# Patient Record
Sex: FEMALE | Race: WHITE | Employment: FULL TIME | URBAN - METROPOLITAN AREA
[De-identification: names, ages, dates, MRNs, and addresses within clinical notes are randomized per-mention and may not be internally consistent; named-entity substitution may affect disease eponyms.]

---

## 2019-01-05 ENCOUNTER — HOSPITAL ENCOUNTER (OUTPATIENT)
Age: 64
Discharge: HOME | End: 2019-01-05
Payer: COMMERCIAL

## 2019-01-05 LAB
ALBUMIN SERPL-MCNC: 4.2 G/DL (ref 3.5–5.2)
ALP BLD-CCNC: 61 U/L (ref 35–104)
ALT SERPL-CCNC: 118 U/L (ref 0–32)
ANION GAP SERPL CALCULATED.3IONS-SCNC: 11 MMOL/L (ref 7–16)
AST SERPL-CCNC: 84 U/L (ref 0–31)
BASOPHILS ABSOLUTE: 0.07 E9/L (ref 0–0.2)
BASOPHILS RELATIVE PERCENT: 1.1 % (ref 0–2)
BILIRUB SERPL-MCNC: 1.8 MG/DL (ref 0–1.2)
BUN BLDV-MCNC: 20 MG/DL (ref 8–23)
CALCIUM SERPL-MCNC: 8.8 MG/DL (ref 8.6–10.2)
CHLORIDE BLD-SCNC: 102 MMOL/L (ref 98–107)
CHOLESTEROL, TOTAL: 152 MG/DL (ref 0–199)
CO2: 27 MMOL/L (ref 22–29)
CREAT SERPL-MCNC: 0.7 MG/DL (ref 0.5–1)
CREATININE URINE: 148 MG/DL (ref 29–226)
EOSINOPHILS ABSOLUTE: 0.11 E9/L (ref 0.05–0.5)
EOSINOPHILS RELATIVE PERCENT: 1.8 % (ref 0–6)
GFR AFRICAN AMERICAN: >60
GFR NON-AFRICAN AMERICAN: >60 ML/MIN/1.73
GLUCOSE BLD-MCNC: 209 MG/DL (ref 74–99)
HBA1C MFR BLD: 7.9 % (ref 4–5.6)
HCT VFR BLD CALC: 41.4 % (ref 34–48)
HDLC SERPL-MCNC: 42 MG/DL
HEMOGLOBIN: 14.1 G/DL (ref 11.5–15.5)
IMMATURE GRANULOCYTES #: 0.02 E9/L
IMMATURE GRANULOCYTES %: 0.3 % (ref 0–5)
LDL CHOLESTEROL CALCULATED: 83 MG/DL (ref 0–99)
LYMPHOCYTES ABSOLUTE: 2.04 E9/L (ref 1.5–4)
LYMPHOCYTES RELATIVE PERCENT: 33.4 % (ref 20–42)
MCH RBC QN AUTO: 30.9 PG (ref 26–35)
MCHC RBC AUTO-ENTMCNC: 34.1 % (ref 32–34.5)
MCV RBC AUTO: 90.8 FL (ref 80–99.9)
MICROALBUMIN UR-MCNC: <12 MG/L
MICROALBUMIN/CREAT UR-RTO: ABNORMAL (ref 0–30)
MONOCYTES ABSOLUTE: 0.51 E9/L (ref 0.1–0.95)
MONOCYTES RELATIVE PERCENT: 8.3 % (ref 2–12)
NEUTROPHILS ABSOLUTE: 3.36 E9/L (ref 1.8–7.3)
NEUTROPHILS RELATIVE PERCENT: 55.1 % (ref 43–80)
PDW BLD-RTO: 12.4 FL (ref 11.5–15)
PLATELET # BLD: 144 E9/L (ref 130–450)
PMV BLD AUTO: 11.1 FL (ref 7–12)
POTASSIUM SERPL-SCNC: 4.4 MMOL/L (ref 3.5–5)
RBC # BLD: 4.56 E12/L (ref 3.5–5.5)
SODIUM BLD-SCNC: 140 MMOL/L (ref 132–146)
TOTAL PROTEIN: 6.8 G/DL (ref 6.4–8.3)
TRIGL SERPL-MCNC: 133 MG/DL (ref 0–149)
TSH SERPL DL<=0.05 MIU/L-ACNC: 1.09 UIU/ML (ref 0.27–4.2)
VITAMIN D 25-HYDROXY: 24 NG/ML (ref 30–100)
VLDLC SERPL CALC-MCNC: 27 MG/DL
WBC # BLD: 6.1 E9/L (ref 4.5–11.5)

## 2019-01-05 PROCEDURE — 83036 HEMOGLOBIN GLYCOSYLATED A1C: CPT

## 2019-01-05 PROCEDURE — 82044 UR ALBUMIN SEMIQUANTITATIVE: CPT

## 2019-01-05 PROCEDURE — 82570 ASSAY OF URINE CREATININE: CPT

## 2019-01-05 PROCEDURE — 36415 COLL VENOUS BLD VENIPUNCTURE: CPT

## 2019-01-05 PROCEDURE — 85025 COMPLETE CBC W/AUTO DIFF WBC: CPT

## 2019-01-05 PROCEDURE — 84443 ASSAY THYROID STIM HORMONE: CPT

## 2019-01-05 PROCEDURE — 80061 LIPID PANEL: CPT

## 2019-01-05 PROCEDURE — 82306 VITAMIN D 25 HYDROXY: CPT

## 2019-01-05 PROCEDURE — 80053 COMPREHEN METABOLIC PANEL: CPT

## 2019-02-15 ENCOUNTER — APPOINTMENT (OUTPATIENT)
Dept: GENERAL RADIOLOGY | Age: 64
End: 2019-02-15
Payer: COMMERCIAL

## 2019-02-15 ENCOUNTER — APPOINTMENT (OUTPATIENT)
Dept: CT IMAGING | Age: 64
End: 2019-02-15
Payer: COMMERCIAL

## 2019-02-15 ENCOUNTER — HOSPITAL ENCOUNTER (EMERGENCY)
Age: 64
Discharge: HOME | End: 2019-02-15
Attending: EMERGENCY MEDICINE
Payer: COMMERCIAL

## 2019-02-15 VITALS
OXYGEN SATURATION: 97 % | DIASTOLIC BLOOD PRESSURE: 70 MMHG | BODY MASS INDEX: 38.68 KG/M2 | RESPIRATION RATE: 17 BRPM | HEIGHT: 60 IN | SYSTOLIC BLOOD PRESSURE: 142 MMHG | TEMPERATURE: 97.9 F | WEIGHT: 197 LBS | HEART RATE: 64 BPM

## 2019-02-15 DIAGNOSIS — R10.10 PAIN OF UPPER ABDOMEN: Primary | ICD-10-CM

## 2019-02-15 LAB
ALBUMIN SERPL-MCNC: 4 G/DL (ref 3.5–5.2)
ALP BLD-CCNC: 79 U/L (ref 35–104)
ALT SERPL-CCNC: 44 U/L (ref 0–32)
ANION GAP SERPL CALCULATED.3IONS-SCNC: 14 MMOL/L (ref 7–16)
AST SERPL-CCNC: 30 U/L (ref 0–31)
BASOPHILS ABSOLUTE: 0.06 E9/L (ref 0–0.2)
BASOPHILS RELATIVE PERCENT: 0.6 % (ref 0–2)
BILIRUB SERPL-MCNC: 1 MG/DL (ref 0–1.2)
BILIRUBIN URINE: NEGATIVE
BLOOD, URINE: NEGATIVE
BUN BLDV-MCNC: 21 MG/DL (ref 8–23)
CALCIUM SERPL-MCNC: 9 MG/DL (ref 8.6–10.2)
CHLORIDE BLD-SCNC: 101 MMOL/L (ref 98–107)
CLARITY: CLEAR
CO2: 25 MMOL/L (ref 22–29)
COLOR: YELLOW
CREAT SERPL-MCNC: 0.7 MG/DL (ref 0.5–1)
EOSINOPHILS ABSOLUTE: 1.54 E9/L (ref 0.05–0.5)
EOSINOPHILS RELATIVE PERCENT: 16.4 % (ref 0–6)
GFR AFRICAN AMERICAN: >60
GFR NON-AFRICAN AMERICAN: >60 ML/MIN/1.73
GLUCOSE BLD-MCNC: 108 MG/DL (ref 74–99)
GLUCOSE URINE: NEGATIVE MG/DL
HCT VFR BLD CALC: 45.4 % (ref 34–48)
HEMOGLOBIN: 15.1 G/DL (ref 11.5–15.5)
IMMATURE GRANULOCYTES #: 0.02 E9/L
IMMATURE GRANULOCYTES %: 0.2 % (ref 0–5)
KETONES, URINE: ABNORMAL MG/DL
LACTIC ACID: 1.2 MMOL/L (ref 0.5–2.2)
LEUKOCYTE ESTERASE, URINE: NEGATIVE
LIPASE: 44 U/L (ref 13–60)
LYMPHOCYTES ABSOLUTE: 2.22 E9/L (ref 1.5–4)
LYMPHOCYTES RELATIVE PERCENT: 23.7 % (ref 20–42)
MCH RBC QN AUTO: 30.5 PG (ref 26–35)
MCHC RBC AUTO-ENTMCNC: 33.3 % (ref 32–34.5)
MCV RBC AUTO: 91.7 FL (ref 80–99.9)
MONOCYTES ABSOLUTE: 0.67 E9/L (ref 0.1–0.95)
MONOCYTES RELATIVE PERCENT: 7.2 % (ref 2–12)
NEUTROPHILS ABSOLUTE: 4.86 E9/L (ref 1.8–7.3)
NEUTROPHILS RELATIVE PERCENT: 51.9 % (ref 43–80)
NITRITE, URINE: NEGATIVE
PDW BLD-RTO: 12.4 FL (ref 11.5–15)
PH UA: 5.5 (ref 5–9)
PLATELET # BLD: 161 E9/L (ref 130–450)
PMV BLD AUTO: 11.2 FL (ref 7–12)
POTASSIUM SERPL-SCNC: 4.1 MMOL/L (ref 3.5–5)
PROTEIN UA: NEGATIVE MG/DL
RBC # BLD: 4.95 E12/L (ref 3.5–5.5)
SODIUM BLD-SCNC: 140 MMOL/L (ref 132–146)
SPECIFIC GRAVITY UA: >=1.03 (ref 1–1.03)
TOTAL PROTEIN: 6.7 G/DL (ref 6.4–8.3)
TROPONIN: <0.01 NG/ML (ref 0–0.03)
UROBILINOGEN, URINE: 0.2 E.U./DL
WBC # BLD: 9.4 E9/L (ref 4.5–11.5)

## 2019-02-15 PROCEDURE — 81003 URINALYSIS AUTO W/O SCOPE: CPT

## 2019-02-15 PROCEDURE — 83690 ASSAY OF LIPASE: CPT

## 2019-02-15 PROCEDURE — 36415 COLL VENOUS BLD VENIPUNCTURE: CPT

## 2019-02-15 PROCEDURE — 83605 ASSAY OF LACTIC ACID: CPT

## 2019-02-15 PROCEDURE — 99284 EMERGENCY DEPT VISIT MOD MDM: CPT

## 2019-02-15 PROCEDURE — 2580000003 HC RX 258: Performed by: STUDENT IN AN ORGANIZED HEALTH CARE EDUCATION/TRAINING PROGRAM

## 2019-02-15 PROCEDURE — 71045 X-RAY EXAM CHEST 1 VIEW: CPT

## 2019-02-15 PROCEDURE — 85025 COMPLETE CBC W/AUTO DIFF WBC: CPT

## 2019-02-15 PROCEDURE — 6360000002 HC RX W HCPCS: Performed by: STUDENT IN AN ORGANIZED HEALTH CARE EDUCATION/TRAINING PROGRAM

## 2019-02-15 PROCEDURE — 6360000004 HC RX CONTRAST MEDICATION: Performed by: RADIOLOGY

## 2019-02-15 PROCEDURE — 74177 CT ABD & PELVIS W/CONTRAST: CPT

## 2019-02-15 PROCEDURE — 80053 COMPREHEN METABOLIC PANEL: CPT

## 2019-02-15 PROCEDURE — 96375 TX/PRO/DX INJ NEW DRUG ADDON: CPT

## 2019-02-15 PROCEDURE — 84484 ASSAY OF TROPONIN QUANT: CPT

## 2019-02-15 PROCEDURE — 6370000000 HC RX 637 (ALT 250 FOR IP): Performed by: STUDENT IN AN ORGANIZED HEALTH CARE EDUCATION/TRAINING PROGRAM

## 2019-02-15 PROCEDURE — 96374 THER/PROPH/DIAG INJ IV PUSH: CPT

## 2019-02-15 RX ORDER — 0.9 % SODIUM CHLORIDE 0.9 %
1000 INTRAVENOUS SOLUTION INTRAVENOUS ONCE
Status: DISCONTINUED | OUTPATIENT
Start: 2019-02-15 | End: 2019-02-15

## 2019-02-15 RX ORDER — DEXLANSOPRAZOLE 60 MG/1
60 CAPSULE, DELAYED RELEASE ORAL DAILY
COMMUNITY

## 2019-02-15 RX ORDER — MORPHINE SULFATE 4 MG/ML
4 INJECTION, SOLUTION INTRAMUSCULAR; INTRAVENOUS ONCE
Status: COMPLETED | OUTPATIENT
Start: 2019-02-15 | End: 2019-02-15

## 2019-02-15 RX ORDER — GLIPIZIDE 5 MG/1
10 TABLET ORAL
COMMUNITY
End: 2019-02-15 | Stop reason: DRUGHIGH

## 2019-02-15 RX ORDER — ONDANSETRON 2 MG/ML
4 INJECTION INTRAMUSCULAR; INTRAVENOUS ONCE
Status: COMPLETED | OUTPATIENT
Start: 2019-02-15 | End: 2019-02-15

## 2019-02-15 RX ORDER — LATANOPROST 50 UG/ML
1 SOLUTION/ DROPS OPHTHALMIC NIGHTLY
COMMUNITY

## 2019-02-15 RX ORDER — GLIPIZIDE 5 MG/1
10 TABLET, FILM COATED, EXTENDED RELEASE ORAL DAILY
COMMUNITY

## 2019-02-15 RX ORDER — 0.9 % SODIUM CHLORIDE 0.9 %
1000 INTRAVENOUS SOLUTION INTRAVENOUS ONCE
Status: COMPLETED | OUTPATIENT
Start: 2019-02-15 | End: 2019-02-15

## 2019-02-15 RX ORDER — LOSARTAN POTASSIUM 50 MG/1
50 TABLET ORAL DAILY
COMMUNITY

## 2019-02-15 RX ORDER — SUCRALFATE ORAL 1 G/10ML
1 SUSPENSION ORAL 4 TIMES DAILY
Qty: 1200 ML | Refills: 0 | Status: SHIPPED | OUTPATIENT
Start: 2019-02-15 | End: 2019-03-17

## 2019-02-15 RX ADMIN — LIDOCAINE HYDROCHLORIDE: 20 SOLUTION ORAL; TOPICAL at 10:29

## 2019-02-15 RX ADMIN — MORPHINE SULFATE 4 MG: 4 INJECTION, SOLUTION INTRAMUSCULAR; INTRAVENOUS at 09:40

## 2019-02-15 RX ADMIN — IOHEXOL 50 ML: 240 INJECTION, SOLUTION INTRATHECAL; INTRAVASCULAR; INTRAVENOUS; ORAL at 11:29

## 2019-02-15 RX ADMIN — ONDANSETRON 4 MG: 2 INJECTION INTRAMUSCULAR; INTRAVENOUS at 09:40

## 2019-02-15 RX ADMIN — SODIUM CHLORIDE 1000 ML: 9 INJECTION, SOLUTION INTRAVENOUS at 09:40

## 2019-02-15 RX ADMIN — IOPAMIDOL 80 ML: 755 INJECTION, SOLUTION INTRAVENOUS at 11:29

## 2019-02-15 ASSESSMENT — ENCOUNTER SYMPTOMS
SHORTNESS OF BREATH: 0
COLOR CHANGE: 0
SORE THROAT: 0
BLOOD IN STOOL: 0
ABDOMINAL DISTENTION: 0
ABDOMINAL PAIN: 1
STRIDOR: 0
COUGH: 0
BACK PAIN: 0
VOMITING: 0
NAUSEA: 1
WHEEZING: 0
CONSTIPATION: 0
ANAL BLEEDING: 0
DIARRHEA: 1

## 2019-02-15 ASSESSMENT — PAIN DESCRIPTION - PAIN TYPE
TYPE: ACUTE PAIN
TYPE: ACUTE PAIN

## 2019-02-15 ASSESSMENT — PAIN DESCRIPTION - LOCATION: LOCATION: ABDOMEN

## 2019-02-15 ASSESSMENT — PAIN SCALES - GENERAL
PAINLEVEL_OUTOF10: 4
PAINLEVEL_OUTOF10: 10

## 2019-02-15 ASSESSMENT — PAIN DESCRIPTION - DESCRIPTORS: DESCRIPTORS: ACHING

## 2019-02-19 LAB
EKG ATRIAL RATE: 80 BPM
EKG P AXIS: 46 DEGREES
EKG P-R INTERVAL: 162 MS
EKG Q-T INTERVAL: 406 MS
EKG QRS DURATION: 94 MS
EKG QTC CALCULATION (BAZETT): 468 MS
EKG R AXIS: 36 DEGREES
EKG T AXIS: 22 DEGREES
EKG VENTRICULAR RATE: 80 BPM

## 2019-02-21 ENCOUNTER — HOSPITAL ENCOUNTER (OUTPATIENT)
Age: 64
Discharge: HOME | End: 2019-02-21
Payer: COMMERCIAL

## 2019-02-21 PROCEDURE — 87329 GIARDIA AG IA: CPT

## 2019-02-21 PROCEDURE — 87324 CLOSTRIDIUM AG IA: CPT

## 2019-02-21 PROCEDURE — 89055 LEUKOCYTE ASSESSMENT FECAL: CPT

## 2019-02-21 PROCEDURE — 87045 FECES CULTURE AEROBIC BACT: CPT

## 2019-02-23 LAB
C DIFFICILE TOXIN, EIA: NORMAL
WHITE BLOOD CELLS (WBC), STOOL: NORMAL

## 2019-02-24 LAB — CULTURE, STOOL: NORMAL

## 2019-02-25 LAB — GIARDIA ANTIGEN STOOL: NORMAL

## 2019-04-09 ENCOUNTER — HOSPITAL ENCOUNTER (OUTPATIENT)
Age: 64
Discharge: HOME | End: 2019-04-09
Payer: COMMERCIAL

## 2019-04-09 LAB
ALBUMIN SERPL-MCNC: 4.1 G/DL (ref 3.5–5.2)
ALP BLD-CCNC: 59 U/L (ref 35–104)
ALT SERPL-CCNC: 43 U/L (ref 0–32)
AST SERPL-CCNC: 30 U/L (ref 0–31)
BILIRUB SERPL-MCNC: 1 MG/DL (ref 0–1.2)
BILIRUBIN DIRECT: 0.2 MG/DL (ref 0–0.3)
BILIRUBIN, INDIRECT: 0.8 MG/DL (ref 0–1)
TOTAL PROTEIN: 6.4 G/DL (ref 6.4–8.3)

## 2019-04-09 PROCEDURE — 80076 HEPATIC FUNCTION PANEL: CPT

## 2019-04-09 PROCEDURE — 36415 COLL VENOUS BLD VENIPUNCTURE: CPT

## 2019-04-20 ENCOUNTER — HOSPITAL ENCOUNTER (OUTPATIENT)
Age: 64
Discharge: HOME | End: 2019-04-20
Payer: COMMERCIAL

## 2019-04-20 LAB
FERRITIN: 166 NG/ML
IRON SATURATION: 26 % (ref 15–50)
IRON: 77 MCG/DL (ref 37–145)
TOTAL IRON BINDING CAPACITY: 291 MCG/DL (ref 250–450)

## 2019-04-20 PROCEDURE — 82728 ASSAY OF FERRITIN: CPT

## 2019-04-20 PROCEDURE — 86038 ANTINUCLEAR ANTIBODIES: CPT

## 2019-04-20 PROCEDURE — 36415 COLL VENOUS BLD VENIPUNCTURE: CPT

## 2019-04-20 PROCEDURE — 80074 ACUTE HEPATITIS PANEL: CPT

## 2019-04-20 PROCEDURE — 86255 FLUORESCENT ANTIBODY SCREEN: CPT

## 2019-04-20 PROCEDURE — 83550 IRON BINDING TEST: CPT

## 2019-04-20 PROCEDURE — 83540 ASSAY OF IRON: CPT

## 2019-04-22 LAB
ANTI-NUCLEAR ANTIBODY (ANA): NEGATIVE
HAV IGM SER IA-ACNC: NORMAL
HEPATITIS B CORE IGM ANTIBODY: NORMAL
HEPATITIS B SURFACE ANTIGEN INTERPRETATION: NORMAL
HEPATITIS C ANTIBODY INTERPRETATION: NORMAL
SMOOTH MUSCLE ANTIBODY: NEGATIVE

## 2020-08-19 ENCOUNTER — HOSPITAL ENCOUNTER (OUTPATIENT)
Dept: ULTRASOUND IMAGING | Age: 65
Discharge: HOME | End: 2020-08-19
Payer: COMMERCIAL

## 2020-08-19 PROCEDURE — 76536 US EXAM OF HEAD AND NECK: CPT

## 2023-01-11 ENCOUNTER — HOSPITAL ENCOUNTER (OUTPATIENT)
Dept: ULTRASOUND IMAGING | Age: 68
Discharge: HOME | End: 2023-01-11
Payer: COMMERCIAL

## 2023-01-11 DIAGNOSIS — E04.2 NONTOXIC MULTINODULAR GOITER: ICD-10-CM

## 2023-01-11 PROCEDURE — 76536 US EXAM OF HEAD AND NECK: CPT

## 2023-03-29 ENCOUNTER — APPOINTMENT (OUTPATIENT)
Dept: LAB | Facility: LAB | Age: 68
End: 2023-03-29
Payer: COMMERCIAL

## 2023-03-29 ENCOUNTER — TELEPHONE (OUTPATIENT)
Dept: PRIMARY CARE | Facility: CLINIC | Age: 68
End: 2023-03-29
Payer: COMMERCIAL

## 2023-03-29 DIAGNOSIS — R19.7 DIARRHEA OF PRESUMED INFECTIOUS ORIGIN: Primary | ICD-10-CM

## 2023-03-29 NOTE — TELEPHONE ENCOUNTER
called very concerned about patient, says she has had no stop diarrhea for two days now. John states that patient went to see a relative who was passing away at the time and they found out the relative had CDIFF   He is very concerned that she is getting dehydrated   Wants to know if she should be tested and/or if something can be sent in to [facility] in [place]

## 2023-03-31 ENCOUNTER — TELEPHONE (OUTPATIENT)
Dept: PRIMARY CARE | Facility: CLINIC | Age: 68
End: 2023-03-31
Payer: COMMERCIAL

## 2023-03-31 NOTE — TELEPHONE ENCOUNTER
Patient called and said she missed work due to her diarrhea   Got the testing you ordered for her, says her work is making her do fmla since she missed 4 or more days   Asking if you can fill this out for her, she was not seen in office since we had no appointments

## 2023-04-11 PROBLEM — E11.9 TYPE 2 DIABETES MELLITUS (MULTI): Status: ACTIVE | Noted: 2019-07-25

## 2023-04-11 PROBLEM — G89.29 CHRONIC BILATERAL LOW BACK PAIN WITH BILATERAL SCIATICA: Status: ACTIVE | Noted: 2023-04-11

## 2023-04-11 PROBLEM — J30.2 SEASONAL ALLERGIES: Status: ACTIVE | Noted: 2023-04-11

## 2023-04-11 PROBLEM — K21.9 GERD (GASTROESOPHAGEAL REFLUX DISEASE): Status: ACTIVE | Noted: 2023-04-11

## 2023-04-11 PROBLEM — R19.4 CHANGE IN BOWEL HABITS: Status: ACTIVE | Noted: 2023-04-11

## 2023-04-11 PROBLEM — E78.5 HYPERLIPIDEMIA: Status: ACTIVE | Noted: 2019-07-25

## 2023-04-11 PROBLEM — R29.898 LOWER EXTREMITY WEAKNESS: Status: ACTIVE | Noted: 2023-04-11

## 2023-04-11 PROBLEM — J32.9 SINUSITIS: Status: ACTIVE | Noted: 2023-04-11

## 2023-04-11 PROBLEM — R10.30 ABDOMINAL PAIN, LOWER: Status: ACTIVE | Noted: 2023-04-11

## 2023-04-11 PROBLEM — K62.5 RECTAL BLEEDING: Status: ACTIVE | Noted: 2023-04-11

## 2023-04-11 PROBLEM — I10 BENIGN ESSENTIAL HTN: Status: ACTIVE | Noted: 2023-04-11

## 2023-04-11 PROBLEM — M54.31 SCIATICA OF RIGHT SIDE: Status: ACTIVE | Noted: 2023-04-11

## 2023-04-11 PROBLEM — M54.42 CHRONIC BILATERAL LOW BACK PAIN WITH BILATERAL SCIATICA: Status: ACTIVE | Noted: 2023-04-11

## 2023-04-11 PROBLEM — M54.41 CHRONIC BILATERAL LOW BACK PAIN WITH BILATERAL SCIATICA: Status: ACTIVE | Noted: 2023-04-11

## 2023-04-11 RX ORDER — INSULIN GLARGINE 300 U/ML
23 INJECTION, SOLUTION SUBCUTANEOUS DAILY
COMMUNITY
Start: 2021-06-08

## 2023-04-11 RX ORDER — DICYCLOMINE HYDROCHLORIDE 10 MG/1
10 CAPSULE ORAL EVERY 6 HOURS PRN
COMMUNITY
Start: 2022-07-19

## 2023-04-11 RX ORDER — ROSUVASTATIN CALCIUM 20 MG/1
1 TABLET, COATED ORAL DAILY
COMMUNITY
Start: 2021-10-05 | End: 2023-04-12 | Stop reason: SINTOL

## 2023-04-11 RX ORDER — GABAPENTIN 100 MG/1
1 CAPSULE ORAL 3 TIMES DAILY
COMMUNITY
Start: 2021-11-30 | End: 2023-04-12 | Stop reason: SDUPTHER

## 2023-04-11 RX ORDER — GLIPIZIDE 5 MG/1
2 TABLET, FILM COATED, EXTENDED RELEASE ORAL DAILY
COMMUNITY
Start: 2021-04-13

## 2023-04-11 RX ORDER — LOSARTAN POTASSIUM 50 MG/1
1 TABLET ORAL DAILY
COMMUNITY
Start: 2020-04-15 | End: 2023-04-12 | Stop reason: SDDI

## 2023-04-11 RX ORDER — DEXLANSOPRAZOLE 60 MG/1
1 CAPSULE, DELAYED RELEASE ORAL DAILY
COMMUNITY
Start: 2021-02-26 | End: 2023-04-12 | Stop reason: SDUPTHER

## 2023-04-11 RX ORDER — TIMOLOL MALEATE 6.8 MG/ML
SOLUTION/ DROPS OPHTHALMIC
COMMUNITY

## 2023-04-11 RX ORDER — LATANOPROST 50 UG/ML
SOLUTION/ DROPS OPHTHALMIC
COMMUNITY
Start: 2022-07-19

## 2023-04-12 ENCOUNTER — LAB (OUTPATIENT)
Dept: LAB | Facility: LAB | Age: 68
End: 2023-04-12
Payer: COMMERCIAL

## 2023-04-12 ENCOUNTER — OFFICE VISIT (OUTPATIENT)
Dept: PRIMARY CARE | Facility: CLINIC | Age: 68
End: 2023-04-12
Payer: COMMERCIAL

## 2023-04-12 VITALS
RESPIRATION RATE: 18 BRPM | BODY MASS INDEX: 37.5 KG/M2 | OXYGEN SATURATION: 98 % | DIASTOLIC BLOOD PRESSURE: 70 MMHG | SYSTOLIC BLOOD PRESSURE: 122 MMHG | TEMPERATURE: 97.9 F | HEART RATE: 78 BPM | HEIGHT: 60 IN

## 2023-04-12 DIAGNOSIS — M54.41 CHRONIC BILATERAL LOW BACK PAIN WITH BILATERAL SCIATICA: ICD-10-CM

## 2023-04-12 DIAGNOSIS — R25.2 MUSCLE CRAMPING: Primary | ICD-10-CM

## 2023-04-12 DIAGNOSIS — G89.29 CHRONIC BILATERAL LOW BACK PAIN WITH BILATERAL SCIATICA: ICD-10-CM

## 2023-04-12 DIAGNOSIS — M54.42 CHRONIC BILATERAL LOW BACK PAIN WITH BILATERAL SCIATICA: ICD-10-CM

## 2023-04-12 DIAGNOSIS — K21.9 GASTROESOPHAGEAL REFLUX DISEASE, UNSPECIFIED WHETHER ESOPHAGITIS PRESENT: ICD-10-CM

## 2023-04-12 DIAGNOSIS — E11.9 TYPE 2 DIABETES MELLITUS WITHOUT COMPLICATION, WITHOUT LONG-TERM CURRENT USE OF INSULIN (MULTI): ICD-10-CM

## 2023-04-12 DIAGNOSIS — R25.2 MUSCLE CRAMPING: ICD-10-CM

## 2023-04-12 PROBLEM — E78.2 MIXED HYPERLIPIDEMIA: Status: ACTIVE | Noted: 2019-07-25

## 2023-04-12 LAB
ANION GAP IN SER/PLAS: 11 MMOL/L (ref 10–20)
BASOPHILS (10*3/UL) IN BLOOD BY AUTOMATED COUNT: 0.05 X10E9/L (ref 0–0.1)
BASOPHILS/100 LEUKOCYTES IN BLOOD BY AUTOMATED COUNT: 0.7 % (ref 0–2)
CALCIUM (MG/DL) IN SER/PLAS: 9.4 MG/DL (ref 8.6–10.3)
CARBON DIOXIDE, TOTAL (MMOL/L) IN SER/PLAS: 31 MMOL/L (ref 21–32)
CHLORIDE (MMOL/L) IN SER/PLAS: 101 MMOL/L (ref 98–107)
COBALAMIN (VITAMIN B12) (PG/ML) IN SER/PLAS: 461 PG/ML (ref 211–911)
CREATINE KINASE (U/L) IN SER/PLAS: 79 U/L (ref 0–215)
CREATININE (MG/DL) IN SER/PLAS: 0.68 MG/DL (ref 0.5–1.05)
EOSINOPHILS (10*3/UL) IN BLOOD BY AUTOMATED COUNT: 0.09 X10E9/L (ref 0–0.7)
EOSINOPHILS/100 LEUKOCYTES IN BLOOD BY AUTOMATED COUNT: 1.2 % (ref 0–6)
ERYTHROCYTE DISTRIBUTION WIDTH (RATIO) BY AUTOMATED COUNT: 12.9 % (ref 11.5–14.5)
ERYTHROCYTE MEAN CORPUSCULAR HEMOGLOBIN CONCENTRATION (G/DL) BY AUTOMATED: 32.2 G/DL (ref 32–36)
ERYTHROCYTE MEAN CORPUSCULAR VOLUME (FL) BY AUTOMATED COUNT: 94 FL (ref 80–100)
ERYTHROCYTES (10*6/UL) IN BLOOD BY AUTOMATED COUNT: 4.87 X10E12/L (ref 4–5.2)
GFR FEMALE: >90 ML/MIN/1.73M2
GLUCOSE (MG/DL) IN SER/PLAS: 142 MG/DL (ref 74–99)
HEMATOCRIT (%) IN BLOOD BY AUTOMATED COUNT: 46 % (ref 36–46)
HEMOGLOBIN (G/DL) IN BLOOD: 14.8 G/DL (ref 12–16)
IMMATURE GRANULOCYTES/100 LEUKOCYTES IN BLOOD BY AUTOMATED COUNT: 0.3 % (ref 0–0.9)
LEUKOCYTES (10*3/UL) IN BLOOD BY AUTOMATED COUNT: 7.3 X10E9/L (ref 4.4–11.3)
LYMPHOCYTES (10*3/UL) IN BLOOD BY AUTOMATED COUNT: 2.45 X10E9/L (ref 1.2–4.8)
LYMPHOCYTES/100 LEUKOCYTES IN BLOOD BY AUTOMATED COUNT: 33.5 % (ref 13–44)
MAGNESIUM (MG/DL) IN SER/PLAS: 2.29 MG/DL (ref 1.6–2.4)
MONOCYTES (10*3/UL) IN BLOOD BY AUTOMATED COUNT: 0.63 X10E9/L (ref 0.1–1)
MONOCYTES/100 LEUKOCYTES IN BLOOD BY AUTOMATED COUNT: 8.6 % (ref 2–10)
NEUTROPHILS (10*3/UL) IN BLOOD BY AUTOMATED COUNT: 4.07 X10E9/L (ref 1.2–7.7)
NEUTROPHILS/100 LEUKOCYTES IN BLOOD BY AUTOMATED COUNT: 55.7 % (ref 40–80)
PLATELETS (10*3/UL) IN BLOOD AUTOMATED COUNT: 206 X10E9/L (ref 150–450)
POTASSIUM (MMOL/L) IN SER/PLAS: 5 MMOL/L (ref 3.5–5.3)
SODIUM (MMOL/L) IN SER/PLAS: 138 MMOL/L (ref 136–145)
UREA NITROGEN (MG/DL) IN SER/PLAS: 17 MG/DL (ref 6–23)

## 2023-04-12 PROCEDURE — 80048 BASIC METABOLIC PNL TOTAL CA: CPT

## 2023-04-12 PROCEDURE — 82607 VITAMIN B-12: CPT

## 2023-04-12 PROCEDURE — 85025 COMPLETE CBC W/AUTO DIFF WBC: CPT

## 2023-04-12 PROCEDURE — 99214 OFFICE O/P EST MOD 30 MIN: CPT | Performed by: FAMILY MEDICINE

## 2023-04-12 PROCEDURE — 1036F TOBACCO NON-USER: CPT | Performed by: FAMILY MEDICINE

## 2023-04-12 PROCEDURE — 82550 ASSAY OF CK (CPK): CPT

## 2023-04-12 PROCEDURE — 36415 COLL VENOUS BLD VENIPUNCTURE: CPT

## 2023-04-12 PROCEDURE — 1159F MED LIST DOCD IN RCRD: CPT | Performed by: FAMILY MEDICINE

## 2023-04-12 PROCEDURE — 3074F SYST BP LT 130 MM HG: CPT | Performed by: FAMILY MEDICINE

## 2023-04-12 PROCEDURE — 83036 HEMOGLOBIN GLYCOSYLATED A1C: CPT

## 2023-04-12 PROCEDURE — 3078F DIAST BP <80 MM HG: CPT | Performed by: FAMILY MEDICINE

## 2023-04-12 PROCEDURE — 1160F RVW MEDS BY RX/DR IN RCRD: CPT | Performed by: FAMILY MEDICINE

## 2023-04-12 PROCEDURE — 83735 ASSAY OF MAGNESIUM: CPT

## 2023-04-12 RX ORDER — SEMAGLUTIDE 2.68 MG/ML
2 INJECTION, SOLUTION SUBCUTANEOUS
COMMUNITY

## 2023-04-12 RX ORDER — GABAPENTIN 100 MG/1
200 CAPSULE ORAL 3 TIMES DAILY
Qty: 180 CAPSULE | Refills: 5 | Status: SHIPPED | OUTPATIENT
Start: 2023-04-12 | End: 2024-04-30

## 2023-04-12 RX ORDER — DEXLANSOPRAZOLE 60 MG/1
1 CAPSULE, DELAYED RELEASE ORAL DAILY
Qty: 90 CAPSULE | Refills: 3 | Status: SHIPPED | OUTPATIENT
Start: 2023-04-12 | End: 2024-03-26

## 2023-04-12 ASSESSMENT — ENCOUNTER SYMPTOMS
CHILLS: 0
DEPRESSION: 0
PALPITATIONS: 0
NERVOUS/ANXIOUS: 0
VOMITING: 0
DIARRHEA: 0
MYALGIAS: 1
OCCASIONAL FEELINGS OF UNSTEADINESS: 0
DIZZINESS: 0
CONSTIPATION: 0
COUGH: 0
SHORTNESS OF BREATH: 0
HEADACHES: 0
ABDOMINAL PAIN: 1
ARTHRALGIAS: 1
DYSPHORIC MOOD: 0
BACK PAIN: 1
FATIGUE: 1
LOSS OF SENSATION IN FEET: 0
WEAKNESS: 1
NAUSEA: 1
FEVER: 0

## 2023-04-12 ASSESSMENT — PATIENT HEALTH QUESTIONNAIRE - PHQ9
2. FEELING DOWN, DEPRESSED OR HOPELESS: NOT AT ALL
1. LITTLE INTEREST OR PLEASURE IN DOING THINGS: NOT AT ALL
SUM OF ALL RESPONSES TO PHQ9 QUESTIONS 1 AND 2: 0

## 2023-04-12 NOTE — PROGRESS NOTES
Subjective   Patient ID: Yoanna Baker is a 67 y.o. female who presents for Hospital Follow-up (Stomach pains & leg cramps).    Was seen in ER on 3/29/23 for diarrhea after exposure to c diff    Stool cultures negative, CT abdomen consistent with enterocolitis, was treated with flagyl and has completed regimen reports stools are now formed. Now having constant leg and muscle cramping with fatigue since, no improvement with massage, electrolyte replacement. Gabapentin helping some but not adequate relief. Has not tried anything else OTC. Denies injury, numbness, tingling or weakness of the extremities.     Needs refills of her PPI.              Review of Systems   Constitutional:  Positive for fatigue. Negative for chills and fever.   Eyes:  Negative for visual disturbance.   Respiratory:  Negative for cough and shortness of breath.    Cardiovascular:  Negative for chest pain and palpitations.   Gastrointestinal:  Positive for abdominal pain and nausea. Negative for constipation, diarrhea and vomiting.   Musculoskeletal:  Positive for arthralgias, back pain and myalgias.   Skin:  Negative for rash.   Neurological:  Positive for weakness. Negative for dizziness, syncope and headaches.   Psychiatric/Behavioral:  Negative for dysphoric mood. The patient is not nervous/anxious.        Objective   /70   Pulse 78   Temp 36.6 °C (97.9 °F)   Resp 18   Ht 1.524 m (5')   SpO2 98%   BMI 37.50 kg/m²     Physical Exam  Constitutional:       Appearance: Normal appearance. She is obese.   HENT:      Head: Normocephalic and atraumatic.   Cardiovascular:      Rate and Rhythm: Normal rate and regular rhythm.      Heart sounds: No murmur heard.     No gallop.   Pulmonary:      Effort: Pulmonary effort is normal. No respiratory distress.      Breath sounds: Normal breath sounds.   Abdominal:      General: Bowel sounds are normal. There is no distension.      Tenderness: There is no abdominal tenderness.    Musculoskeletal:         General: Normal range of motion.   Skin:     General: Skin is warm and dry.      Findings: No lesion or rash.   Neurological:      General: No focal deficit present.      Mental Status: She is alert and oriented to person, place, and time. Mental status is at baseline.   Psychiatric:         Mood and Affect: Mood normal.         Behavior: Behavior normal.         Assessment/Plan   Problem List Items Addressed This Visit          Digestive    GERD (gastroesophageal reflux disease)    Relevant Medications    dexlansoprazole (Dexilant) 60 mg DR capsule       Endocrine/Metabolic    Type 2 diabetes mellitus (CMS/HCC)    Relevant Orders    Hemoglobin A1C       Other    Chronic bilateral low back pain with bilateral sciatica    Relevant Medications    gabapentin (Neurontin) 100 mg capsule     Other Visit Diagnoses       Muscle cramping    -  Primary    Relevant Orders    Basic Metabolic Panel    Magnesium    Vitamin B12    CBC and Auto Differential    CK

## 2023-04-13 ENCOUNTER — TELEPHONE (OUTPATIENT)
Dept: PRIMARY CARE | Facility: CLINIC | Age: 68
End: 2023-04-13
Payer: COMMERCIAL

## 2023-04-13 LAB
ESTIMATED AVERAGE GLUCOSE FOR HBA1C: 166 MG/DL
HEMOGLOBIN A1C/HEMOGLOBIN TOTAL IN BLOOD: 7.4 %

## 2023-04-13 RX ORDER — METHOCARBAMOL 500 MG/1
500 TABLET, FILM COATED ORAL 3 TIMES DAILY PRN
Qty: 21 TABLET | Refills: 0 | Status: SHIPPED | OUTPATIENT
Start: 2023-04-13 | End: 2024-04-30 | Stop reason: ALTCHOICE

## 2023-04-13 NOTE — TELEPHONE ENCOUNTER
----- Message from TRINH Hensley-CNP sent at 4/13/2023 10:48 AM EDT -----  A1c is 7.4 but other then that all lab work is normal. I am going to send in a muscle relaxer for you to try to see if this helps with the pain you are experiencing. This medication can make you sleepy so do not take it before doing anything that requires your complete attention like driving, etc. Please let me know if they have any questions.

## 2023-04-13 NOTE — RESULT ENCOUNTER NOTE
A1c is 7.4 but other then that all lab work is normal. I am going to send in a muscle relaxer for you to try to see if this helps with the pain you are experiencing. This medication can make you sleepy so do not take it before doing anything that requires your complete attention like driving, etc. Please let me know if they have any questions.

## 2023-06-19 ENCOUNTER — OFFICE VISIT (OUTPATIENT)
Dept: PRIMARY CARE | Facility: CLINIC | Age: 68
End: 2023-06-19
Payer: COMMERCIAL

## 2023-06-19 VITALS
DIASTOLIC BLOOD PRESSURE: 72 MMHG | WEIGHT: 187 LBS | TEMPERATURE: 97.8 F | HEIGHT: 60 IN | OXYGEN SATURATION: 97 % | BODY MASS INDEX: 36.71 KG/M2 | HEART RATE: 83 BPM | SYSTOLIC BLOOD PRESSURE: 142 MMHG | RESPIRATION RATE: 16 BRPM

## 2023-06-19 DIAGNOSIS — T63.441A: Primary | ICD-10-CM

## 2023-06-19 PROCEDURE — 1159F MED LIST DOCD IN RCRD: CPT | Performed by: NURSE PRACTITIONER

## 2023-06-19 PROCEDURE — 3077F SYST BP >= 140 MM HG: CPT | Performed by: NURSE PRACTITIONER

## 2023-06-19 PROCEDURE — 1160F RVW MEDS BY RX/DR IN RCRD: CPT | Performed by: NURSE PRACTITIONER

## 2023-06-19 PROCEDURE — 3078F DIAST BP <80 MM HG: CPT | Performed by: NURSE PRACTITIONER

## 2023-06-19 PROCEDURE — 3051F HG A1C>EQUAL 7.0%<8.0%: CPT | Performed by: NURSE PRACTITIONER

## 2023-06-19 PROCEDURE — 99213 OFFICE O/P EST LOW 20 MIN: CPT | Performed by: NURSE PRACTITIONER

## 2023-06-19 PROCEDURE — 1036F TOBACCO NON-USER: CPT | Performed by: NURSE PRACTITIONER

## 2023-06-19 RX ORDER — EPINEPHRINE 0.3 MG/.3ML
1 INJECTION SUBCUTANEOUS ONCE AS NEEDED
Qty: 2 EACH | Refills: 1 | Status: SHIPPED | OUTPATIENT
Start: 2023-06-19

## 2023-06-19 RX ORDER — TRIAMCINOLONE ACETONIDE 40 MG/ML
40 INJECTION, SUSPENSION INTRA-ARTICULAR; INTRAMUSCULAR ONCE
Status: SHIPPED | OUTPATIENT
Start: 2023-06-19

## 2023-06-19 RX ORDER — EPINEPHRINE 0.3 MG/.3ML
1 INJECTION, SOLUTION INTRAMUSCULAR AS NEEDED
Qty: 1 EACH | Refills: 5 | Status: SHIPPED | OUTPATIENT
Start: 2023-06-19 | End: 2024-06-18

## 2023-06-19 ASSESSMENT — ENCOUNTER SYMPTOMS
EYES NEGATIVE: 1
CONSTITUTIONAL NEGATIVE: 1
CARDIOVASCULAR NEGATIVE: 1
RESPIRATORY NEGATIVE: 1
ALLERGIC/IMMUNOLOGIC NEGATIVE: 1
GASTROINTESTINAL NEGATIVE: 1
LOSS OF SENSATION IN FEET: 0
ENDOCRINE NEGATIVE: 1
MUSCULOSKELETAL NEGATIVE: 1
OCCASIONAL FEELINGS OF UNSTEADINESS: 0
PSYCHIATRIC NEGATIVE: 1
DEPRESSION: 0
HEMATOLOGIC/LYMPHATIC NEGATIVE: 1
HEADACHES: 1

## 2023-06-19 ASSESSMENT — LIFESTYLE VARIABLES
HOW OFTEN DO YOU HAVE SIX OR MORE DRINKS ON ONE OCCASION: NEVER
AUDIT-C TOTAL SCORE: 0
SKIP TO QUESTIONS 9-10: 1
HOW MANY STANDARD DRINKS CONTAINING ALCOHOL DO YOU HAVE ON A TYPICAL DAY: PATIENT DOES NOT DRINK
HOW OFTEN DO YOU HAVE A DRINK CONTAINING ALCOHOL: NEVER

## 2023-06-19 ASSESSMENT — PATIENT HEALTH QUESTIONNAIRE - PHQ9
SUM OF ALL RESPONSES TO PHQ9 QUESTIONS 1 & 2: 0
2. FEELING DOWN, DEPRESSED OR HOPELESS: NOT AT ALL
1. LITTLE INTEREST OR PLEASURE IN DOING THINGS: NOT AT ALL

## 2023-06-19 NOTE — PATIENT INSTRUCTIONS
Dr Gallegos [phone]- [address]     Steroid shot today     Take up to 50 mg benedryl at bed time

## 2023-06-19 NOTE — PROGRESS NOTES
Subjective   Patient ID: Yoanna Baker is a 67 y.o. female who presents for Insect Bite (06/17/23 Wasp sting Right under chin area swollen and very pink. Patient states itchy, and warm to touchy. Patient used OTC cream with no results.).    HPI   Patient here for wasp sting last Saturday. Has has had increase swelling, redness and itchiness to the area since the sting. Denies shortness of breat, trouble swallow but can feel the swelling. Tender to touch. Positive for headache. Negative for previous be sting reactions.   Review of Systems   Constitutional: Negative.    HENT:          Bee sting to right chin, with swelling   Eyes: Negative.    Respiratory: Negative.     Cardiovascular: Negative.    Gastrointestinal: Negative.    Endocrine: Negative.    Genitourinary: Negative.    Musculoskeletal: Negative.    Allergic/Immunologic: Negative.    Neurological:  Positive for headaches.   Hematological: Negative.    Psychiatric/Behavioral: Negative.         Objective   /72   Pulse 83   Temp 36.6 °C (97.8 °F) (Temporal)   Resp 16   Ht 1.524 m (5')   Wt 84.8 kg (187 lb)   SpO2 97%   BMI 36.52 kg/m²     Physical Exam  Neck:      Comments: Right chin/neck swelling, warmth, redness  Cardiovascular:      Rate and Rhythm: Normal rate and regular rhythm.      Pulses: Normal pulses.      Heart sounds: Normal heart sounds.   Pulmonary:      Effort: Pulmonary effort is normal.      Breath sounds: Normal breath sounds.   Abdominal:      General: Bowel sounds are normal.      Palpations: Abdomen is soft.   Musculoskeletal:      Cervical back: Normal range of motion.   Skin:     General: Skin is warm.      Comments: Right chin swelling, redness warmth to touch.    Neurological:      Mental Status: She is alert and oriented to person, place, and time.   Psychiatric:         Mood and Affect: Mood normal.         Behavior: Behavior normal.         Thought Content: Thought content normal.         Judgment: Judgment  normal.         Assessment/Plan   Problem List Items Addressed This Visit    None  Visit Diagnoses       Toxic effect of venom of bees, accidental, init    -  Primary    Relevant Medications    triamcinolone acetonide (Kenalog-40) injection 40 mg    EPINEPHrine (Epipen) 0.3 mg/0.3 mL injection syringe    Auvi-Q 0.3 mg/0.3 mL injection syringe    Other Relevant Orders    Referral to Allergy        Discussed coupons for EpiPens and the tendency for reactions to become worse - recommendation to allergy

## 2024-04-30 ENCOUNTER — LAB (OUTPATIENT)
Dept: LAB | Facility: LAB | Age: 69
End: 2024-04-30
Payer: MEDICARE

## 2024-04-30 ENCOUNTER — OFFICE VISIT (OUTPATIENT)
Dept: PRIMARY CARE | Facility: CLINIC | Age: 69
End: 2024-04-30
Payer: MEDICARE

## 2024-04-30 VITALS
WEIGHT: 170.4 LBS | DIASTOLIC BLOOD PRESSURE: 80 MMHG | HEART RATE: 81 BPM | OXYGEN SATURATION: 96 % | BODY MASS INDEX: 33.45 KG/M2 | SYSTOLIC BLOOD PRESSURE: 140 MMHG | HEIGHT: 60 IN

## 2024-04-30 DIAGNOSIS — Z13.220 LIPID SCREENING: Primary | ICD-10-CM

## 2024-04-30 DIAGNOSIS — Z13.6 SCREENING FOR CARDIOVASCULAR CONDITION: ICD-10-CM

## 2024-04-30 DIAGNOSIS — E66.01 OBESITY, MORBID (MULTI): ICD-10-CM

## 2024-04-30 DIAGNOSIS — Z78.0 ASYMPTOMATIC POSTMENOPAUSAL STATE: ICD-10-CM

## 2024-04-30 DIAGNOSIS — E11.9 TYPE 2 DIABETES MELLITUS WITHOUT COMPLICATION, WITHOUT LONG-TERM CURRENT USE OF INSULIN (MULTI): ICD-10-CM

## 2024-04-30 DIAGNOSIS — E78.2 MIXED HYPERLIPIDEMIA: ICD-10-CM

## 2024-04-30 DIAGNOSIS — Z00.00 WELCOME TO MEDICARE PREVENTIVE VISIT: ICD-10-CM

## 2024-04-30 DIAGNOSIS — Z00.00 MEDICARE ANNUAL WELLNESS VISIT, SUBSEQUENT: ICD-10-CM

## 2024-04-30 DIAGNOSIS — L98.9 SKIN LESION OF LEFT ARM: ICD-10-CM

## 2024-04-30 DIAGNOSIS — Z00.00 ROUTINE GENERAL MEDICAL EXAMINATION AT HEALTH CARE FACILITY: ICD-10-CM

## 2024-04-30 DIAGNOSIS — I10 BENIGN ESSENTIAL HTN: ICD-10-CM

## 2024-04-30 DIAGNOSIS — Z12.31 BREAST CANCER SCREENING BY MAMMOGRAM: ICD-10-CM

## 2024-04-30 DIAGNOSIS — Z13.220 LIPID SCREENING: ICD-10-CM

## 2024-04-30 LAB
ALBUMIN SERPL BCP-MCNC: 4.1 G/DL (ref 3.4–5)
ALP SERPL-CCNC: 72 U/L (ref 33–136)
ALT SERPL W P-5'-P-CCNC: 44 U/L (ref 7–45)
ANION GAP SERPL CALC-SCNC: 10 MMOL/L (ref 10–20)
AST SERPL W P-5'-P-CCNC: 29 U/L (ref 9–39)
BASOPHILS # BLD AUTO: 0.06 X10*3/UL (ref 0–0.1)
BASOPHILS NFR BLD AUTO: 0.7 %
BILIRUB SERPL-MCNC: 1.3 MG/DL (ref 0–1.2)
BUN SERPL-MCNC: 19 MG/DL (ref 6–23)
CALCIUM SERPL-MCNC: 8.9 MG/DL (ref 8.6–10.3)
CHLORIDE SERPL-SCNC: 104 MMOL/L (ref 98–107)
CHOLEST SERPL-MCNC: 171 MG/DL (ref 0–199)
CHOLESTEROL/HDL RATIO: 3.7
CO2 SERPL-SCNC: 30 MMOL/L (ref 21–32)
CREAT SERPL-MCNC: 0.65 MG/DL (ref 0.5–1.05)
CREAT UR-MCNC: 160.2 MG/DL (ref 20–320)
EGFRCR SERPLBLD CKD-EPI 2021: >90 ML/MIN/1.73M*2
EOSINOPHIL # BLD AUTO: 0.06 X10*3/UL (ref 0–0.7)
EOSINOPHIL NFR BLD AUTO: 0.7 %
ERYTHROCYTE [DISTWIDTH] IN BLOOD BY AUTOMATED COUNT: 13 % (ref 11.5–14.5)
GLUCOSE SERPL-MCNC: 118 MG/DL (ref 74–99)
HCT VFR BLD AUTO: 46 % (ref 36–46)
HDLC SERPL-MCNC: 46.4 MG/DL
HGB BLD-MCNC: 14.7 G/DL (ref 12–16)
IMM GRANULOCYTES # BLD AUTO: 0.02 X10*3/UL (ref 0–0.7)
IMM GRANULOCYTES NFR BLD AUTO: 0.2 % (ref 0–0.9)
LDLC SERPL CALC-MCNC: 97 MG/DL
LYMPHOCYTES # BLD AUTO: 2.27 X10*3/UL (ref 1.2–4.8)
LYMPHOCYTES NFR BLD AUTO: 27.5 %
MCH RBC QN AUTO: 30.1 PG (ref 26–34)
MCHC RBC AUTO-ENTMCNC: 32 G/DL (ref 32–36)
MCV RBC AUTO: 94 FL (ref 80–100)
MICROALBUMIN UR-MCNC: 8.8 MG/L
MICROALBUMIN/CREAT UR: 5.5 UG/MG CREAT
MONOCYTES # BLD AUTO: 0.57 X10*3/UL (ref 0.1–1)
MONOCYTES NFR BLD AUTO: 6.9 %
NEUTROPHILS # BLD AUTO: 5.26 X10*3/UL (ref 1.2–7.7)
NEUTROPHILS NFR BLD AUTO: 64 %
NON HDL CHOLESTEROL: 125 MG/DL (ref 0–149)
NRBC BLD-RTO: 0 /100 WBCS (ref 0–0)
PLATELET # BLD AUTO: 175 X10*3/UL (ref 150–450)
POTASSIUM SERPL-SCNC: 4.4 MMOL/L (ref 3.5–5.3)
PROT SERPL-MCNC: 6.5 G/DL (ref 6.4–8.2)
RBC # BLD AUTO: 4.89 X10*6/UL (ref 4–5.2)
SODIUM SERPL-SCNC: 140 MMOL/L (ref 136–145)
TRIGL SERPL-MCNC: 139 MG/DL (ref 0–149)
TSH SERPL-ACNC: 1.3 MIU/L (ref 0.44–3.98)
VLDL: 28 MG/DL (ref 0–40)
WBC # BLD AUTO: 8.2 X10*3/UL (ref 4.4–11.3)

## 2024-04-30 PROCEDURE — 82043 UR ALBUMIN QUANTITATIVE: CPT

## 2024-04-30 PROCEDURE — 90677 PCV20 VACCINE IM: CPT | Performed by: FAMILY MEDICINE

## 2024-04-30 PROCEDURE — G0009 ADMIN PNEUMOCOCCAL VACCINE: HCPCS | Performed by: FAMILY MEDICINE

## 2024-04-30 PROCEDURE — 84443 ASSAY THYROID STIM HORMONE: CPT

## 2024-04-30 PROCEDURE — 1157F ADVNC CARE PLAN IN RCRD: CPT | Performed by: FAMILY MEDICINE

## 2024-04-30 PROCEDURE — 3048F LDL-C <100 MG/DL: CPT | Performed by: FAMILY MEDICINE

## 2024-04-30 PROCEDURE — 36415 COLL VENOUS BLD VENIPUNCTURE: CPT

## 2024-04-30 PROCEDURE — G0438 PPPS, INITIAL VISIT: HCPCS | Performed by: FAMILY MEDICINE

## 2024-04-30 PROCEDURE — 80053 COMPREHEN METABOLIC PANEL: CPT

## 2024-04-30 PROCEDURE — 3077F SYST BP >= 140 MM HG: CPT | Performed by: FAMILY MEDICINE

## 2024-04-30 PROCEDURE — 3079F DIAST BP 80-89 MM HG: CPT | Performed by: FAMILY MEDICINE

## 2024-04-30 PROCEDURE — 1036F TOBACCO NON-USER: CPT | Performed by: FAMILY MEDICINE

## 2024-04-30 PROCEDURE — 1160F RVW MEDS BY RX/DR IN RCRD: CPT | Performed by: FAMILY MEDICINE

## 2024-04-30 PROCEDURE — 1170F FXNL STATUS ASSESSED: CPT | Performed by: FAMILY MEDICINE

## 2024-04-30 PROCEDURE — 93000 ELECTROCARDIOGRAM COMPLETE: CPT | Performed by: FAMILY MEDICINE

## 2024-04-30 PROCEDURE — 1158F ADVNC CARE PLAN TLK DOCD: CPT | Performed by: FAMILY MEDICINE

## 2024-04-30 PROCEDURE — 1159F MED LIST DOCD IN RCRD: CPT | Performed by: FAMILY MEDICINE

## 2024-04-30 PROCEDURE — 80061 LIPID PANEL: CPT

## 2024-04-30 PROCEDURE — 85025 COMPLETE CBC W/AUTO DIFF WBC: CPT

## 2024-04-30 PROCEDURE — 82570 ASSAY OF URINE CREATININE: CPT

## 2024-04-30 PROCEDURE — 3061F NEG MICROALBUMINURIA REV: CPT | Performed by: FAMILY MEDICINE

## 2024-04-30 PROCEDURE — 1123F ACP DISCUSS/DSCN MKR DOCD: CPT | Performed by: FAMILY MEDICINE

## 2024-04-30 PROCEDURE — 83036 HEMOGLOBIN GLYCOSYLATED A1C: CPT

## 2024-04-30 RX ORDER — EZETIMIBE 10 MG
1 TABLET ORAL DAILY
COMMUNITY
Start: 2024-02-15

## 2024-04-30 ASSESSMENT — ENCOUNTER SYMPTOMS
FATIGUE: 0
MYALGIAS: 1
CHILLS: 0
NERVOUS/ANXIOUS: 0
BACK PAIN: 1
VOMITING: 0
DYSPHORIC MOOD: 0
SHORTNESS OF BREATH: 0
DIZZINESS: 0
DIARRHEA: 0
LOSS OF SENSATION IN FEET: 0
COUGH: 0
FEVER: 0
HEADACHES: 0
ABDOMINAL PAIN: 0
CONSTIPATION: 0
PALPITATIONS: 0
WEAKNESS: 0
DEPRESSION: 0
ARTHRALGIAS: 1
NAUSEA: 0
OCCASIONAL FEELINGS OF UNSTEADINESS: 0

## 2024-04-30 ASSESSMENT — VISUAL ACUITY
OS_CC: 20/20
OD_CC: 20/20

## 2024-04-30 ASSESSMENT — PATIENT HEALTH QUESTIONNAIRE - PHQ9
1. LITTLE INTEREST OR PLEASURE IN DOING THINGS: NOT AT ALL
SUM OF ALL RESPONSES TO PHQ9 QUESTIONS 1 AND 2: 0
2. FEELING DOWN, DEPRESSED OR HOPELESS: NOT AT ALL

## 2024-04-30 ASSESSMENT — ACTIVITIES OF DAILY LIVING (ADL)
DOING_HOUSEWORK: INDEPENDENT
BATHING: INDEPENDENT
MANAGING_FINANCES: INDEPENDENT
TAKING_MEDICATION: INDEPENDENT
DRESSING: INDEPENDENT
GROCERY_SHOPPING: INDEPENDENT

## 2024-04-30 NOTE — ASSESSMENT & PLAN NOTE
Currently following with endocrinology, is due for A1c, ordered today. Has a follow up with endocrinology next month. Is currently on ozempic and toujeo.

## 2024-04-30 NOTE — PROGRESS NOTES
Subjective   Reason for Visit: Yoanna Baker is an 68 y.o. female here for a Medicare Wellness visit.          Reviewed all medications by prescribing practitioner or clinical pharmacist (such as prescriptions, OTCs, herbal therapies and supplements) and documented in the medical record.    Presents for welcome to medicare exam    Has concerns of changing appearance of skin lesion of left forearm    Myalgias- recently started on zetia as she previously did not tolerate statins. Reports increase in myalgias since starting zetia. Discussed CT cardiac scoring screening and is agreeable         Patient Care Team:  IJEOMA Henslye as PCP - General (Family Medicine)  IJEOMA Allen as PCP - MMO ACO PCP  Dino Fitzgerald as Consulting Physician (Endocrinology)     Review of Systems   Constitutional:  Negative for chills, fatigue and fever.   Eyes:  Negative for visual disturbance.   Respiratory:  Negative for cough and shortness of breath.    Cardiovascular:  Negative for chest pain and palpitations.   Gastrointestinal:  Negative for abdominal pain, constipation, diarrhea, nausea and vomiting.   Musculoskeletal:  Positive for arthralgias, back pain and myalgias.   Skin:  Negative for rash.   Neurological:  Negative for dizziness, syncope, weakness and headaches.   Psychiatric/Behavioral:  Negative for dysphoric mood. The patient is not nervous/anxious.        Objective   Vitals:  /80   Pulse 81   Ht 1.524 m (5')   Wt 77.3 kg (170 lb 6.4 oz)   SpO2 96%   BMI 33.28 kg/m²       Physical Exam  Constitutional:       Appearance: Normal appearance.   HENT:      Head: Normocephalic and atraumatic.   Cardiovascular:      Rate and Rhythm: Normal rate and regular rhythm.      Heart sounds: No murmur heard.     No gallop.   Pulmonary:      Effort: Pulmonary effort is normal. No respiratory distress.      Breath sounds: Normal breath sounds.   Abdominal:      General: Bowel sounds are normal.  There is no distension.      Tenderness: There is no abdominal tenderness.   Musculoskeletal:         General: Normal range of motion.   Skin:     General: Skin is warm and dry.      Findings: Lesion (left forearm lesion approximately 1.5 cm in diameter purple in color with surrounding erythema) present. No rash.   Neurological:      General: No focal deficit present.      Mental Status: She is alert and oriented to person, place, and time. Mental status is at baseline.   Psychiatric:         Mood and Affect: Mood normal.         Behavior: Behavior normal.         Assessment/Plan   Problem List Items Addressed This Visit       Benign essential HTN    Hyperlipidemia    Type 2 diabetes mellitus (Multi)    Current Assessment & Plan     Currently following with endocrinology, is due for A1c, ordered today. Has a follow up with endocrinology next month. Is currently on ozempic and toujeo.          Relevant Orders    CBC and Auto Differential (Completed)    Hemoglobin A1C    Albumin , Urine Random (Completed)    Follow Up In [facility] - PCP - Established    Obesity, morbid (Multi)    Current Assessment & Plan     This condition has been assessed and is improving. Is down 10 pounds in the last 10 months. Continues on ozempic.          Relevant Orders    Follow Up In [facility] - PCP - Established     Other Visit Diagnoses       Lipid screening    -  Primary    Relevant Orders    Lipid Panel (Completed)    Medicare annual wellness visit, subsequent        Relevant Orders    TSH with reflex to Free T4 if abnormal    Comprehensive Metabolic Panel (Completed)    CBC and Auto Differential (Completed)    Screening for cardiovascular condition        Relevant Orders    CT cardiac scoring wo IV contrast    ECG 12 lead (Completed)    Skin lesion of left arm        Relevant Orders    Referral to Dermatology    Breast cancer screening by mammogram        Relevant Orders    BI mammo bilateral screening  tomosynthesis    Asymptomatic postmenopausal state        Relevant Orders    XR DEXA bone density    Welcome to Medicare preventive visit        Routine general medical examination at health care facility

## 2024-04-30 NOTE — ASSESSMENT & PLAN NOTE
This condition has been assessed and is improving. Is down 10 pounds in the last 10 months. Continues on ozempic.

## 2024-05-02 LAB
EST. AVERAGE GLUCOSE BLD GHB EST-MCNC: 128 MG/DL
HBA1C MFR BLD: 6.1 %

## 2024-06-12 ENCOUNTER — TELEPHONE (OUTPATIENT)
Dept: PRIMARY CARE | Facility: CLINIC | Age: 69
End: 2024-06-12
Payer: MEDICARE

## 2024-06-12 ENCOUNTER — HOSPITAL ENCOUNTER (OUTPATIENT)
Dept: RADIOLOGY | Facility: CLINIC | Age: 69
Discharge: HOME | End: 2024-06-12
Payer: MEDICARE

## 2024-06-12 VITALS — HEIGHT: 60 IN | BODY MASS INDEX: 32.39 KG/M2 | WEIGHT: 165 LBS

## 2024-06-12 DIAGNOSIS — Z12.31 BREAST CANCER SCREENING BY MAMMOGRAM: ICD-10-CM

## 2024-06-12 DIAGNOSIS — Z78.0 ASYMPTOMATIC POSTMENOPAUSAL STATE: ICD-10-CM

## 2024-06-12 PROCEDURE — 77067 SCR MAMMO BI INCL CAD: CPT | Performed by: RADIOLOGY

## 2024-06-12 PROCEDURE — 77063 BREAST TOMOSYNTHESIS BI: CPT | Performed by: RADIOLOGY

## 2024-06-12 PROCEDURE — 77080 DXA BONE DENSITY AXIAL: CPT

## 2024-06-12 PROCEDURE — 77080 DXA BONE DENSITY AXIAL: CPT | Performed by: RADIOLOGY

## 2024-06-12 PROCEDURE — 77063 BREAST TOMOSYNTHESIS BI: CPT

## 2024-06-12 NOTE — TELEPHONE ENCOUNTER
----- Message from IJEOMA Hensley sent at 6/12/2024  5:53 PM EDT -----  Screening mammogram is normal, recommend repeat screening in one year

## 2024-06-13 NOTE — TELEPHONE ENCOUNTER
[facility]  [facility], [address] [address]  Queenie Soares is an otherwise healthy 56 Saeed Washington MD  [facility]  ______________________________________________________________________    

## 2024-06-19 DIAGNOSIS — K21.9 GASTROESOPHAGEAL REFLUX DISEASE, UNSPECIFIED WHETHER ESOPHAGITIS PRESENT: ICD-10-CM

## 2024-06-19 RX ORDER — DEXLANSOPRAZOLE 60 MG/1
1 CAPSULE, DELAYED RELEASE ORAL DAILY
Qty: 90 CAPSULE | Refills: 3 | Status: SHIPPED | OUTPATIENT
Start: 2024-06-19

## 2024-06-25 ENCOUNTER — TELEPHONE (OUTPATIENT)
Dept: PRIMARY CARE | Facility: CLINIC | Age: 69
End: 2024-06-25
Payer: MEDICARE

## 2024-06-25 NOTE — TELEPHONE ENCOUNTER
----- Message from TRINH Hensley-CNP sent at 6/25/2024  4:07 PM EDT -----  Bone density shows osteoporosis (bone thinning) I would like to discuss this further with you including different treatment options. Please schedule an appointment so that we can discuss this and any questions you may have.

## 2024-07-19 ENCOUNTER — APPOINTMENT (OUTPATIENT)
Dept: PRIMARY CARE | Facility: CLINIC | Age: 69
End: 2024-07-19
Payer: MEDICARE

## 2024-07-19 VITALS
WEIGHT: 175 LBS | SYSTOLIC BLOOD PRESSURE: 133 MMHG | HEART RATE: 69 BPM | DIASTOLIC BLOOD PRESSURE: 83 MMHG | HEIGHT: 60 IN | BODY MASS INDEX: 34.36 KG/M2 | OXYGEN SATURATION: 98 %

## 2024-07-19 DIAGNOSIS — E11.9 TYPE 2 DIABETES MELLITUS WITHOUT COMPLICATION, WITHOUT LONG-TERM CURRENT USE OF INSULIN (MULTI): ICD-10-CM

## 2024-07-19 DIAGNOSIS — E78.2 MIXED HYPERLIPIDEMIA: ICD-10-CM

## 2024-07-19 DIAGNOSIS — E66.01 OBESITY, MORBID (MULTI): ICD-10-CM

## 2024-07-19 DIAGNOSIS — M81.6 LOCALIZED OSTEOPOROSIS WITHOUT CURRENT PATHOLOGICAL FRACTURE: Primary | ICD-10-CM

## 2024-07-19 DIAGNOSIS — I10 BENIGN ESSENTIAL HYPERTENSION: ICD-10-CM

## 2024-07-19 PROBLEM — K62.5 RECTAL HEMORRHAGE: Status: RESOLVED | Noted: 2022-08-30 | Resolved: 2024-07-19

## 2024-07-19 PROBLEM — R19.4 CHANGE IN BOWEL HABITS: Status: RESOLVED | Noted: 2023-04-11 | Resolved: 2024-07-19

## 2024-07-19 PROBLEM — R10.30 ABDOMINAL PAIN, LOWER: Status: RESOLVED | Noted: 2023-04-11 | Resolved: 2024-07-19

## 2024-07-19 PROBLEM — M62.81 MUSCLE WEAKNESS OF EXTREMITY: Status: RESOLVED | Noted: 2024-07-19 | Resolved: 2024-07-19

## 2024-07-19 PROBLEM — R10.30 LOWER ABDOMINAL PAIN: Status: RESOLVED | Noted: 2022-08-30 | Resolved: 2024-07-19

## 2024-07-19 PROBLEM — K62.5 RECTAL BLEEDING: Status: RESOLVED | Noted: 2023-04-11 | Resolved: 2024-07-19

## 2024-07-19 PROBLEM — M54.30 SCIATICA: Status: ACTIVE | Noted: 2022-08-30

## 2024-07-19 PROBLEM — R29.898 LOWER EXTREMITY WEAKNESS: Status: RESOLVED | Noted: 2023-04-11 | Resolved: 2024-07-19

## 2024-07-19 PROBLEM — R19.8 ALTERED BOWEL FUNCTION: Status: RESOLVED | Noted: 2024-07-19 | Resolved: 2024-07-19

## 2024-07-19 PROBLEM — K21.9 GASTROESOPHAGEAL REFLUX DISEASE: Status: ACTIVE | Noted: 2022-08-30

## 2024-07-19 PROCEDURE — 1157F ADVNC CARE PLAN IN RCRD: CPT | Performed by: FAMILY MEDICINE

## 2024-07-19 PROCEDURE — 3075F SYST BP GE 130 - 139MM HG: CPT | Performed by: FAMILY MEDICINE

## 2024-07-19 PROCEDURE — 3061F NEG MICROALBUMINURIA REV: CPT | Performed by: FAMILY MEDICINE

## 2024-07-19 PROCEDURE — 99214 OFFICE O/P EST MOD 30 MIN: CPT | Performed by: FAMILY MEDICINE

## 2024-07-19 PROCEDURE — 90471 IMMUNIZATION ADMIN: CPT | Performed by: FAMILY MEDICINE

## 2024-07-19 PROCEDURE — 3044F HG A1C LEVEL LT 7.0%: CPT | Performed by: FAMILY MEDICINE

## 2024-07-19 PROCEDURE — 90715 TDAP VACCINE 7 YRS/> IM: CPT | Performed by: FAMILY MEDICINE

## 2024-07-19 PROCEDURE — 3008F BODY MASS INDEX DOCD: CPT | Performed by: FAMILY MEDICINE

## 2024-07-19 PROCEDURE — 3079F DIAST BP 80-89 MM HG: CPT | Performed by: FAMILY MEDICINE

## 2024-07-19 PROCEDURE — 3048F LDL-C <100 MG/DL: CPT | Performed by: FAMILY MEDICINE

## 2024-07-19 PROCEDURE — 1123F ACP DISCUSS/DSCN MKR DOCD: CPT | Performed by: FAMILY MEDICINE

## 2024-07-19 PROCEDURE — 1159F MED LIST DOCD IN RCRD: CPT | Performed by: FAMILY MEDICINE

## 2024-07-19 PROCEDURE — 1036F TOBACCO NON-USER: CPT | Performed by: FAMILY MEDICINE

## 2024-07-19 RX ORDER — ALENDRONATE SODIUM 70 MG/1
70 TABLET ORAL
Qty: 4 TABLET | Refills: 11 | Status: SHIPPED | OUTPATIENT
Start: 2024-07-19 | End: 2025-07-19

## 2024-07-19 ASSESSMENT — ENCOUNTER SYMPTOMS
DEPRESSION: 0
WEAKNESS: 0
CONSTIPATION: 0
NERVOUS/ANXIOUS: 0
HEADACHES: 0
VOMITING: 0
CHILLS: 0
FEVER: 0
MYALGIAS: 1
ABDOMINAL PAIN: 0
SHORTNESS OF BREATH: 0
NAUSEA: 0
PALPITATIONS: 0
COUGH: 0
LOSS OF SENSATION IN FEET: 0
ARTHRALGIAS: 1
FATIGUE: 0
DYSPHORIC MOOD: 0
DIARRHEA: 0
BACK PAIN: 1
DIZZINESS: 0
OCCASIONAL FEELINGS OF UNSTEADINESS: 0

## 2024-07-19 ASSESSMENT — PATIENT HEALTH QUESTIONNAIRE - PHQ9
2. FEELING DOWN, DEPRESSED OR HOPELESS: NOT AT ALL
SUM OF ALL RESPONSES TO PHQ9 QUESTIONS 1 AND 2: 0
1. LITTLE INTEREST OR PLEASURE IN DOING THINGS: NOT AT ALL

## 2024-07-19 NOTE — PATIENT INSTRUCTIONS
Endocrinology:  Dr. Little in [place]:[phone]  Dr. Dacosta in [place]: [phone]  Dr. Macias in [facility]: [phone]

## 2024-07-24 ENCOUNTER — HOSPITAL ENCOUNTER (OUTPATIENT)
Dept: RADIOLOGY | Facility: CLINIC | Age: 69
Discharge: HOME | End: 2024-07-24
Payer: MEDICARE

## 2024-07-24 DIAGNOSIS — Z13.6 SCREENING FOR CARDIOVASCULAR CONDITION: ICD-10-CM

## 2024-07-24 PROCEDURE — 75571 CT HRT W/O DYE W/CA TEST: CPT

## 2024-07-30 ENCOUNTER — APPOINTMENT (OUTPATIENT)
Dept: PRIMARY CARE | Facility: CLINIC | Age: 69
End: 2024-07-30
Payer: MEDICARE

## 2024-07-31 ENCOUNTER — TELEPHONE (OUTPATIENT)
Dept: PRIMARY CARE | Facility: CLINIC | Age: 69
End: 2024-07-31
Payer: MEDICARE

## 2024-07-31 NOTE — TELEPHONE ENCOUNTER
----- Message from Jessica Brown sent at 7/31/2024  8:18 AM EDT -----  There is mild plaque build up in 3 of the 4 main arteries of your heart with a total score of 73.85 which places you at a mildly increased risk for future heart event, it is important to control your cholesterol,  I recommend lifestyle measures to improve cholesterol which include regular exercise (150 minutes of activity that produces sweat and increases heart rate per week). Healthy diet low in fat, low cholesterol and achieving a healthy weight for height (BMI).  Since you were not able to tolerate the statins or the zeitia, I would recommend trying fenofibrate which is another medication to help lower cholesterol and does not carry the side effects of muscle aches.

## 2024-08-22 NOTE — PROGRESS NOTES
Referred by IJEOMA Wilson for Elevated CT Calcium Score    Counseling:  The patient was counseled regarding diagnostic results, instructions for management, risk factor reductions, prognosis, patient and family education, impressions, risks and benefits of treatment options and importance of compliance with treatment.       History Of Present Illness:    Yoanna Baker is a 68 y.o. female patient whose PMH is significant for HTN, hyperlipidemia, DM type 2 and GERD. She presents today in the company of her  to establish cardiovascular care for the evaluation and management of an elevated CT calcium score of 73.85. The patient states that the CT calcium score was performed on a routine screening basis. She denies any CP, chest discomfort or SOB currently. She reports an episode of upper chest pain with radiation to her neck and around her head with associated dizziness/lightheadedness and gait imbalance. She presented to the ED at that time with negative workup. She denies any personal past history of heart disease, but does have a history of HTN, hyperlipidemia and diabetes. Per the patient, she had a remote heart cath that showed 40% stenosis (unknown vessel). The patient's family history is negative for heart disease.    Past Surgical History:  She has a past surgical history that includes Other surgical history (07/08/2021); Other surgical history (07/08/2021); Other surgical history (07/08/2021); and Other surgical history (07/19/2022).      Social History:  She reports that she has quit smoking. Her smoking use included cigarettes. She has never used smokeless tobacco. She reports that she does not drink alcohol and does not use drugs.    Family History:  Family History   Problem Relation Name Age of Onset    COPD Mother      Other (bleeding ulcer) Father      Diabetes Other grandfather     Heart attack Other grandmother     Rheum arthritis Other grandmother     Stroke Other  grandmother         Allergies:  Patient has no known allergies.    Outpatient Medications:  Current Outpatient Medications   Medication Instructions    alendronate (FOSAMAX) 70 mg, oral, Every 7 days, Take in the morning with a full glass of water, on an empty stomach, and do not take anything else by mouth or lie down for the next 30 min.    Auvi-Q 0.3 mg, injection, As needed, Inject into upper leg. Call 911 after use.    dexlansoprazole (DEXILANT) 60 mg, oral, Daily    dicyclomine (BENTYL) 10 mg, oral, Every 6 hours PRN    EPINEPHrine (EPIPEN) 0.3 mg, injection, Once as needed, Inject into upper leg. Call 911 after use.    glipiZIDE XL (Glucotrol XL) 5 mg 24 hr tablet 2 tablets, oral, Daily    latanoprost (Xalatan) 0.005 % ophthalmic solution ophthalmic (eye)    linaGLIPtin (TRADJENTA) 5 mg, oral, Daily    Ozempic 2 mg    timolol (Timoptic) 0.5 % ophthalmic solution 1 drop, Both Eyes, 2 times daily    Toujeo SoloStar U-300 Insulin 23 Units, subcutaneous, Daily        Last Recorded Vitals:  Vitals:    08/23/24 1349   BP: 114/72   Pulse: 76   Weight: 79.4 kg (175 lb)   Height: 1.524 m (5')       Review of Systems   All other systems reviewed and are negative.     Physical Exam:  Constitutional:       Appearance: Healthy appearance. Not in distress.   Neck:      Vascular: No JVR. JVD normal.   Pulmonary:      Effort: Pulmonary effort is normal.      Breath sounds: Normal breath sounds. No wheezing. No rhonchi. No rales.   Chest:      Chest wall: Not tender to palpatation.   Cardiovascular:      PMI at left midclavicular line. Normal rate. Regular rhythm. Normal S1. Normal S2.       Murmurs: There is no murmur.      No gallop.  No click. No rub.   Pulses:     Intact distal pulses.   Edema:     Peripheral edema absent.   Abdominal:      General: Bowel sounds are normal.      Palpations: Abdomen is soft.      Tenderness: There is no abdominal tenderness.   Musculoskeletal: Normal range of motion.         General: No  tenderness. Skin:     General: Skin is warm and dry.   Neurological:      General: No focal deficit present.      Mental Status: Alert and oriented to person, place and time.            Last Labs:  CBC -  Lab Results   Component Value Date    WBC 8.2 04/30/2024    HGB 14.7 04/30/2024    HCT 46.0 04/30/2024    MCV 94 04/30/2024     04/30/2024       CMP -  Lab Results   Component Value Date    CALCIUM 8.9 04/30/2024    PROT 6.5 04/30/2024    ALBUMIN 4.1 04/30/2024    AST 29 04/30/2024    ALT 44 04/30/2024    ALKPHOS 72 04/30/2024    BILITOT 1.3 (H) 04/30/2024       LIPID PANEL -   Lab Results   Component Value Date    CHOL 171 04/30/2024    TRIG 139 04/30/2024    HDL 46.4 04/30/2024    CHHDL 3.7 04/30/2024    VLDL 28 04/30/2024    NHDL 125 04/30/2024       RENAL FUNCTION PANEL -   Lab Results   Component Value Date    GLUCOSE 118 (H) 04/30/2024     04/30/2024    K 4.4 04/30/2024     04/30/2024    CO2 30 04/30/2024    ANIONGAP 10 04/30/2024    BUN 19 04/30/2024    CREATININE 0.65 04/30/2024    GFRMALE CANCELED 03/29/2023    CALCIUM 8.9 04/30/2024    ALBUMIN 4.1 04/30/2024        Lab Results   Component Value Date    HGBA1C 6.1 (H) 04/30/2024       Last Cardiology Tests:  07/24/2024 - CT Calcium Score  1. Total   73.85.  2. The visualized mid/lower ascending thoracic aorta, is normal in size, measuring 2.8 cm in diameter. The heart is normal in size. No pericardial effusion is present.Main pulmonary artery is normal in caliber.  3. There is no evidence of lymphadenopathy or mass within the visualized mediastinum.The visualized esophagus is unremarkable.  4. The visualized segments of the lungsare normally expanded.  5. The visualized subdiaphragmatic structures demonstrate no remarkable findings.    Lab review: I have personally reviewed the laboratory result(s).  Diagnostic review: I have personally reviewed the result(s) of the CT Calcium Score.    Assessment/Plan   1) Elevated CT Calcium Score in a  Patient with Hyperlipidemia  Intolerant of statins  On Ezetimibe   Total score of 73.85  Lipid panel 04/30/2024 with total cholesterol, LDL and triglycerides of 171, 97 and 139 respectively  Denies CP, chest discomfort or SOB currently  Reports an episode of upper chest pain with radiation to neck and around head with associated dizziness/lightheadedness and gait imbalance - presented to the ED with negative workup.   Per the patient, she had a remote heart cath that showed 40% stenosis (unknown vessel)  Denies any personal past history of heart disease  H/O HTN, hyperlipidemia and diabetes  FH negative for heart disease  Check CTA coronary arteries - advised on pre-procedure metoprolol dosing  F/U after testing       Scribe Attestation  By signing my name below, I, Jackson Pan   attest that this documentation has been prepared under the direction and in the presence of Hugo Campuzano MD.

## 2024-08-23 ENCOUNTER — TELEPHONE (OUTPATIENT)
Dept: PRIMARY CARE | Facility: CLINIC | Age: 69
End: 2024-08-23

## 2024-08-23 ENCOUNTER — APPOINTMENT (OUTPATIENT)
Dept: CARDIOLOGY | Facility: CLINIC | Age: 69
End: 2024-08-23
Payer: MEDICARE

## 2024-08-23 VITALS
WEIGHT: 175 LBS | HEIGHT: 60 IN | DIASTOLIC BLOOD PRESSURE: 72 MMHG | HEART RATE: 76 BPM | SYSTOLIC BLOOD PRESSURE: 114 MMHG | BODY MASS INDEX: 34.36 KG/M2

## 2024-08-23 DIAGNOSIS — E78.2 MIXED HYPERLIPIDEMIA: ICD-10-CM

## 2024-08-23 DIAGNOSIS — T63.441A: ICD-10-CM

## 2024-08-23 DIAGNOSIS — I10 BENIGN ESSENTIAL HYPERTENSION: ICD-10-CM

## 2024-08-23 DIAGNOSIS — I20.89 STABLE ANGINA (CMS-HCC): ICD-10-CM

## 2024-08-23 PROCEDURE — 3078F DIAST BP <80 MM HG: CPT | Performed by: INTERNAL MEDICINE

## 2024-08-23 PROCEDURE — 99203 OFFICE O/P NEW LOW 30 MIN: CPT | Performed by: INTERNAL MEDICINE

## 2024-08-23 PROCEDURE — 3074F SYST BP LT 130 MM HG: CPT | Performed by: INTERNAL MEDICINE

## 2024-08-23 PROCEDURE — 3008F BODY MASS INDEX DOCD: CPT | Performed by: INTERNAL MEDICINE

## 2024-08-23 PROCEDURE — 1036F TOBACCO NON-USER: CPT | Performed by: INTERNAL MEDICINE

## 2024-08-23 PROCEDURE — 1123F ACP DISCUSS/DSCN MKR DOCD: CPT | Performed by: INTERNAL MEDICINE

## 2024-08-23 PROCEDURE — 1159F MED LIST DOCD IN RCRD: CPT | Performed by: INTERNAL MEDICINE

## 2024-08-23 PROCEDURE — 93005 ELECTROCARDIOGRAM TRACING: CPT | Performed by: INTERNAL MEDICINE

## 2024-08-23 PROCEDURE — 93010 ELECTROCARDIOGRAM REPORT: CPT | Performed by: INTERNAL MEDICINE

## 2024-08-23 PROCEDURE — 3061F NEG MICROALBUMINURIA REV: CPT | Performed by: INTERNAL MEDICINE

## 2024-08-23 PROCEDURE — 3048F LDL-C <100 MG/DL: CPT | Performed by: INTERNAL MEDICINE

## 2024-08-23 PROCEDURE — 99213 OFFICE O/P EST LOW 20 MIN: CPT | Performed by: INTERNAL MEDICINE

## 2024-08-23 PROCEDURE — 1157F ADVNC CARE PLAN IN RCRD: CPT | Performed by: INTERNAL MEDICINE

## 2024-08-23 PROCEDURE — 1160F RVW MEDS BY RX/DR IN RCRD: CPT | Performed by: INTERNAL MEDICINE

## 2024-08-23 PROCEDURE — 3044F HG A1C LEVEL LT 7.0%: CPT | Performed by: INTERNAL MEDICINE

## 2024-08-23 RX ORDER — TIMOLOL MALEATE 5 MG/ML
1 SOLUTION/ DROPS OPHTHALMIC 2 TIMES DAILY
COMMUNITY
Start: 2024-07-22

## 2024-08-23 RX ORDER — METOPROLOL TARTRATE 50 MG/1
TABLET ORAL
Qty: 2 TABLET | Refills: 11 | Status: SHIPPED | OUTPATIENT
Start: 2024-08-23

## 2024-08-23 RX ORDER — EPINEPHRINE 0.3 MG/.3ML
1 INJECTION SUBCUTANEOUS ONCE AS NEEDED
Qty: 2 EACH | Refills: 0 | Status: SHIPPED | OUTPATIENT
Start: 2024-08-23

## 2024-08-23 NOTE — PATIENT INSTRUCTIONS
Dr. Campuzano has ordered a CT scan of your coronary arteries. In order to get adequate images, we need your heart rate to be on the slower side. To achieve this, you will take a medication called metoprolol the night before and the morning of the test. A prescription for this medication has been sent to your pharmacy.    Followup with Dr. Campuzano after the above test.    If you have any questions or cardiac concerns, please call our office at [phone].

## 2024-09-09 ENCOUNTER — HOSPITAL ENCOUNTER (OUTPATIENT)
Dept: RADIOLOGY | Facility: HOSPITAL | Age: 69
Discharge: HOME | End: 2024-09-09
Payer: MEDICARE

## 2024-09-09 VITALS — HEART RATE: 66 BPM | DIASTOLIC BLOOD PRESSURE: 65 MMHG | SYSTOLIC BLOOD PRESSURE: 121 MMHG

## 2024-09-09 DIAGNOSIS — E78.2 MIXED HYPERLIPIDEMIA: ICD-10-CM

## 2024-09-09 DIAGNOSIS — I10 BENIGN ESSENTIAL HYPERTENSION: ICD-10-CM

## 2024-09-09 DIAGNOSIS — I20.89 STABLE ANGINA (CMS-HCC): ICD-10-CM

## 2024-09-09 LAB
CREAT SERPL-MCNC: 0.73 MG/DL (ref 0.6–1.3)
GFR SERPL CREATININE-BSD FRML MDRD: 90 ML/MIN/1.73M*2

## 2024-09-09 PROCEDURE — 2500000001 HC RX 250 WO HCPCS SELF ADMINISTERED DRUGS (ALT 637 FOR MEDICARE OP): Performed by: NURSE PRACTITIONER

## 2024-09-09 PROCEDURE — 75574 CT ANGIO HRT W/3D IMAGE: CPT | Performed by: INTERNAL MEDICINE

## 2024-09-09 PROCEDURE — 75574 CT ANGIO HRT W/3D IMAGE: CPT

## 2024-09-09 PROCEDURE — 2550000001 HC RX 255 CONTRASTS: Performed by: INTERNAL MEDICINE

## 2024-09-09 PROCEDURE — 2500000004 HC RX 250 GENERAL PHARMACY W/ HCPCS (ALT 636 FOR OP/ED): Performed by: NURSE PRACTITIONER

## 2024-09-09 PROCEDURE — 75580 N-INVAS EST C FFR SW ALY CTA: CPT | Performed by: INTERNAL MEDICINE

## 2024-09-09 PROCEDURE — 82565 ASSAY OF CREATININE: CPT

## 2024-09-09 PROCEDURE — 2500000005 HC RX 250 GENERAL PHARMACY W/O HCPCS: Performed by: NURSE PRACTITIONER

## 2024-09-09 RX ORDER — EZETIMIBE 10 MG/1
10 TABLET ORAL DAILY
COMMUNITY

## 2024-09-09 RX ORDER — DILTIAZEM HCL/D5W 125 MG/125
5-15 PLASTIC BAG, INJECTION (ML) INTRAVENOUS CONTINUOUS
Status: DISCONTINUED | OUTPATIENT
Start: 2024-09-09 | End: 2024-09-10 | Stop reason: HOSPADM

## 2024-09-09 RX ORDER — DILTIAZEM HYDROCHLORIDE 5 MG/ML
10 INJECTION INTRAVENOUS ONCE
Status: COMPLETED | OUTPATIENT
Start: 2024-09-09 | End: 2024-09-09

## 2024-09-09 RX ORDER — METOPROLOL TARTRATE 1 MG/ML
5 INJECTION, SOLUTION INTRAVENOUS ONCE
Status: COMPLETED | OUTPATIENT
Start: 2024-09-09 | End: 2024-09-09

## 2024-09-09 RX ORDER — METOPROLOL TARTRATE 1 MG/ML
5 INJECTION, SOLUTION INTRAVENOUS ONCE AS NEEDED
Status: COMPLETED | OUTPATIENT
Start: 2024-09-09 | End: 2024-09-09

## 2024-09-09 RX ORDER — ASPIRIN 81 MG/1
81 TABLET ORAL DAILY
COMMUNITY

## 2024-09-09 RX ORDER — NITROGLYCERIN 0.4 MG/1
0.4 TABLET SUBLINGUAL ONCE
Status: COMPLETED | OUTPATIENT
Start: 2024-09-09 | End: 2024-09-09

## 2024-09-11 ENCOUNTER — TELEPHONE (OUTPATIENT)
Dept: CARDIOLOGY | Facility: HOSPITAL | Age: 69
End: 2024-09-11

## 2024-09-11 ENCOUNTER — TELEPHONE (OUTPATIENT)
Dept: CARDIOLOGY | Facility: HOSPITAL | Age: 69
End: 2024-09-11
Payer: MEDICARE

## 2024-09-11 ENCOUNTER — LAB (OUTPATIENT)
Dept: LAB | Facility: LAB | Age: 69
End: 2024-09-11
Payer: MEDICARE

## 2024-09-11 DIAGNOSIS — I10 BENIGN ESSENTIAL HYPERTENSION: ICD-10-CM

## 2024-09-11 DIAGNOSIS — I10 BENIGN ESSENTIAL HYPERTENSION: Primary | ICD-10-CM

## 2024-09-11 DIAGNOSIS — R93.89 ABNORMAL COMPUTED TOMOGRAPHY ANGIOGRAPHY (CTA): ICD-10-CM

## 2024-09-11 LAB
ALBUMIN SERPL BCP-MCNC: 4 G/DL (ref 3.4–5)
ALP SERPL-CCNC: 57 U/L (ref 33–136)
ALT SERPL W P-5'-P-CCNC: 30 U/L (ref 7–45)
ANION GAP SERPL CALC-SCNC: 9 MMOL/L (ref 10–20)
AST SERPL W P-5'-P-CCNC: 18 U/L (ref 9–39)
BILIRUB SERPL-MCNC: 1.3 MG/DL (ref 0–1.2)
BUN SERPL-MCNC: 19 MG/DL (ref 6–23)
CALCIUM SERPL-MCNC: 8.2 MG/DL (ref 8.6–10.3)
CHLORIDE SERPL-SCNC: 106 MMOL/L (ref 98–107)
CO2 SERPL-SCNC: 27 MMOL/L (ref 21–32)
CREAT SERPL-MCNC: 0.63 MG/DL (ref 0.5–1.05)
EGFRCR SERPLBLD CKD-EPI 2021: >90 ML/MIN/1.73M*2
ERYTHROCYTE [DISTWIDTH] IN BLOOD BY AUTOMATED COUNT: 12.6 % (ref 11.5–14.5)
GLUCOSE SERPL-MCNC: 154 MG/DL (ref 74–99)
HCT VFR BLD AUTO: 45 % (ref 36–46)
HGB BLD-MCNC: 15 G/DL (ref 12–16)
MCH RBC QN AUTO: 30.7 PG (ref 26–34)
MCHC RBC AUTO-ENTMCNC: 33.3 G/DL (ref 32–36)
MCV RBC AUTO: 92 FL (ref 80–100)
NRBC BLD-RTO: 0 /100 WBCS (ref 0–0)
PLATELET # BLD AUTO: 173 X10*3/UL (ref 150–450)
POTASSIUM SERPL-SCNC: 4 MMOL/L (ref 3.5–5.3)
PROT SERPL-MCNC: 6.1 G/DL (ref 6.4–8.2)
RBC # BLD AUTO: 4.89 X10*6/UL (ref 4–5.2)
SODIUM SERPL-SCNC: 138 MMOL/L (ref 136–145)
WBC # BLD AUTO: 7.6 X10*3/UL (ref 4.4–11.3)

## 2024-09-11 PROCEDURE — 85027 COMPLETE CBC AUTOMATED: CPT

## 2024-09-11 PROCEDURE — 36415 COLL VENOUS BLD VENIPUNCTURE: CPT

## 2024-09-11 PROCEDURE — 80053 COMPREHEN METABOLIC PANEL: CPT

## 2024-09-11 NOTE — TELEPHONE ENCOUNTER
Called patient and left voicemail in regards to getting her cath scheduled.     If she calls in please have her speak with me

## 2024-09-11 NOTE — TELEPHONE ENCOUNTER
Called cath instructions to patient including review of date and arrival time.  Verified no need for dye prep.  Labs reviewed.  Nothing to eat or drink after midnight except metoprolol/ASA with a sip of water the morning of the cath.  You will need a .  Bring your ID, insurance cards and either a perfect list of the medications you are swallowing or the medications in original bottles.  Wear comfortable clothing.  There is a possibility of staying over night for observation so pack a bag with necessities for that.  Patient correctly repeated instructions, verbalized understanding and is agreeable to the plan.

## 2024-09-11 NOTE — TELEPHONE ENCOUNTER
Rn called pt at this time regarding results and plan. Orders were placed along with blood work. Pt is going today for.         ----- Message from Hugo Campuzano sent at 9/11/2024 11:27 AM EDT -----  Needs a left heart cath

## 2024-09-18 RX ORDER — SODIUM CHLORIDE 9 MG/ML
100 INJECTION, SOLUTION INTRAVENOUS CONTINUOUS
Status: CANCELLED | OUTPATIENT
Start: 2024-09-18

## 2024-09-19 ENCOUNTER — PHARMACY VISIT (OUTPATIENT)
Dept: PHARMACY | Facility: CLINIC | Age: 69
End: 2024-09-19
Payer: COMMERCIAL

## 2024-09-19 ENCOUNTER — HOSPITAL ENCOUNTER (OUTPATIENT)
Facility: HOSPITAL | Age: 69
Setting detail: OUTPATIENT SURGERY
Discharge: HOME | End: 2024-09-19
Attending: INTERNAL MEDICINE | Admitting: INTERNAL MEDICINE
Payer: MEDICARE

## 2024-09-19 VITALS
DIASTOLIC BLOOD PRESSURE: 80 MMHG | RESPIRATION RATE: 12 BRPM | WEIGHT: 165 LBS | BODY MASS INDEX: 32.39 KG/M2 | SYSTOLIC BLOOD PRESSURE: 140 MMHG | OXYGEN SATURATION: 96 % | TEMPERATURE: 98.1 F | HEART RATE: 87 BPM | HEIGHT: 60 IN

## 2024-09-19 DIAGNOSIS — R93.89 ABNORMAL COMPUTED TOMOGRAPHY ANGIOGRAPHY (CTA): Primary | ICD-10-CM

## 2024-09-19 DIAGNOSIS — I20.9 ANGINA PECTORIS, UNSPECIFIED: ICD-10-CM

## 2024-09-19 DIAGNOSIS — I25.10 CAD (CORONARY ARTERY DISEASE): ICD-10-CM

## 2024-09-19 DIAGNOSIS — R93.1 ABNORMAL FINDINGS ON DIAGNOSTIC IMAGING OF HEART AND CORONARY CIRCULATION: ICD-10-CM

## 2024-09-19 DIAGNOSIS — E78.5 HYPERLIPIDEMIA: ICD-10-CM

## 2024-09-19 DIAGNOSIS — Z78.9 STATIN INTOLERANCE: ICD-10-CM

## 2024-09-19 PROCEDURE — RXMED WILLOW AMBULATORY MEDICATION CHARGE

## 2024-09-19 PROCEDURE — 2500000005 HC RX 250 GENERAL PHARMACY W/O HCPCS: Performed by: INTERNAL MEDICINE

## 2024-09-19 PROCEDURE — C1760 CLOSURE DEV, VASC: HCPCS | Performed by: INTERNAL MEDICINE

## 2024-09-19 PROCEDURE — 93458 L HRT ARTERY/VENTRICLE ANGIO: CPT | Performed by: INTERNAL MEDICINE

## 2024-09-19 PROCEDURE — 96373 THER/PROPH/DIAG INJ IA: CPT | Performed by: INTERNAL MEDICINE

## 2024-09-19 PROCEDURE — C1887 CATHETER, GUIDING: HCPCS | Performed by: INTERNAL MEDICINE

## 2024-09-19 PROCEDURE — 2720000007 HC OR 272 NO HCPCS: Performed by: INTERNAL MEDICINE

## 2024-09-19 PROCEDURE — 7100000009 HC PHASE TWO TIME - INITIAL BASE CHARGE: Performed by: INTERNAL MEDICINE

## 2024-09-19 PROCEDURE — 2500000004 HC RX 250 GENERAL PHARMACY W/ HCPCS (ALT 636 FOR OP/ED): Performed by: INTERNAL MEDICINE

## 2024-09-19 PROCEDURE — C1894 INTRO/SHEATH, NON-LASER: HCPCS | Performed by: INTERNAL MEDICINE

## 2024-09-19 PROCEDURE — 2550000001 HC RX 255 CONTRASTS: Performed by: INTERNAL MEDICINE

## 2024-09-19 PROCEDURE — 7100000010 HC PHASE TWO TIME - EACH INCREMENTAL 1 MINUTE: Performed by: INTERNAL MEDICINE

## 2024-09-19 RX ORDER — SODIUM CHLORIDE 9 MG/ML
1000 INJECTION, SOLUTION INTRAVENOUS ONCE AS NEEDED
Status: CANCELLED | OUTPATIENT
Start: 2024-09-19

## 2024-09-19 RX ORDER — SODIUM CHLORIDE 9 MG/ML
INJECTION, SOLUTION INTRAVENOUS CONTINUOUS PRN
Status: COMPLETED | OUTPATIENT
Start: 2024-09-19 | End: 2024-09-19

## 2024-09-19 RX ORDER — SODIUM CHLORIDE 9 MG/ML
75 INJECTION, SOLUTION INTRAVENOUS CONTINUOUS
Status: CANCELLED | OUTPATIENT
Start: 2024-09-19 | End: 2024-09-20

## 2024-09-19 RX ORDER — FENTANYL CITRATE 50 UG/ML
INJECTION, SOLUTION INTRAMUSCULAR; INTRAVENOUS AS NEEDED
Status: DISCONTINUED | OUTPATIENT
Start: 2024-09-19 | End: 2024-09-19 | Stop reason: HOSPADM

## 2024-09-19 RX ORDER — ACETAMINOPHEN 650 MG/1
650 SUPPOSITORY RECTAL EVERY 6 HOURS PRN
Status: CANCELLED | OUTPATIENT
Start: 2024-09-19

## 2024-09-19 RX ORDER — MIDAZOLAM HYDROCHLORIDE 1 MG/ML
INJECTION, SOLUTION INTRAMUSCULAR; INTRAVENOUS AS NEEDED
Status: DISCONTINUED | OUTPATIENT
Start: 2024-09-19 | End: 2024-09-19 | Stop reason: HOSPADM

## 2024-09-19 RX ORDER — MORPHINE SULFATE 2 MG/ML
2 INJECTION, SOLUTION INTRAMUSCULAR; INTRAVENOUS EVERY 6 HOURS PRN
Status: CANCELLED | OUTPATIENT
Start: 2024-09-19

## 2024-09-19 RX ORDER — ACETAMINOPHEN 325 MG/1
650 TABLET ORAL EVERY 6 HOURS PRN
Status: CANCELLED | OUTPATIENT
Start: 2024-09-19

## 2024-09-19 RX ORDER — HEPARIN SODIUM 1000 [USP'U]/ML
INJECTION, SOLUTION INTRAVENOUS; SUBCUTANEOUS AS NEEDED
Status: DISCONTINUED | OUTPATIENT
Start: 2024-09-19 | End: 2024-09-19 | Stop reason: HOSPADM

## 2024-09-19 RX ORDER — LIDOCAINE HYDROCHLORIDE 20 MG/ML
INJECTION, SOLUTION INFILTRATION; PERINEURAL AS NEEDED
Status: DISCONTINUED | OUTPATIENT
Start: 2024-09-19 | End: 2024-09-19 | Stop reason: HOSPADM

## 2024-09-19 RX ORDER — ACETAMINOPHEN 160 MG/5ML
650 SOLUTION ORAL EVERY 6 HOURS PRN
Status: CANCELLED | OUTPATIENT
Start: 2024-09-19

## 2024-09-19 RX ORDER — EVOLOCUMAB 140 MG/ML
140 INJECTION, SOLUTION SUBCUTANEOUS
Qty: 2 ML | Refills: 11 | Status: SHIPPED | OUTPATIENT
Start: 2024-09-19 | End: 2025-09-19

## 2024-09-19 ASSESSMENT — PAIN - FUNCTIONAL ASSESSMENT
PAIN_FUNCTIONAL_ASSESSMENT: 0-10

## 2024-09-19 ASSESSMENT — PAIN SCALES - GENERAL
PAINLEVEL_OUTOF10: 0 - NO PAIN

## 2024-09-19 ASSESSMENT — COLUMBIA-SUICIDE SEVERITY RATING SCALE - C-SSRS
2. HAVE YOU ACTUALLY HAD ANY THOUGHTS OF KILLING YOURSELF?: NO
6. HAVE YOU EVER DONE ANYTHING, STARTED TO DO ANYTHING, OR PREPARED TO DO ANYTHING TO END YOUR LIFE?: NO
1. IN THE PAST MONTH, HAVE YOU WISHED YOU WERE DEAD OR WISHED YOU COULD GO TO SLEEP AND NOT WAKE UP?: NO

## 2024-09-19 NOTE — NURSING NOTE
Pt verbalized DC instructions and instructions of her new meds. Repatha provided by pharmacy, and pt educated by TERRI Rooney and staff nurses. Pt ambulated with no difficulty, vitals WDL, and IV removed. Radial site clean and dry. Pt wheeled to front entrance for transportation home w .

## 2024-09-19 NOTE — POST-PROCEDURE NOTE
Physician Transition of Care Summary  Invasive Cardiovascular Lab    Procedure Date: 9/19/2024  Attending:    Malorie Campuzano - Primary  Resident/Fellow/Other Assistant: Surgeons and Role:  * No surgeons found with a matching role *    Indications:   Pre-op Diagnosis      * Abnormal computed tomography angiography (CTA) [R93.89]    Post-procedure diagnosis:   Post-op Diagnosis     * Abnormal computed tomography angiography (CTA) [R93.89]    Procedure(s):   Left Heart Cath  08510 - AL CATH PLMT L HRT & ARTS W/NJX & ANGIO IMG S&I        Procedure Findings:   Mild to Moderate CAD  See full report for details     Description of the Procedure:   LHC  RRA>TR band    Complications:   none    Stents/Implants:   Implants       No implant documentation for this case.            Anticoagulation/Antiplatelet Plan:   ASA 81 mg lifelong    Estimated Blood Loss:   5 mL    Anesthesia: Moderate Sedation Anesthesia Staff: No anesthesia staff entered.    Any Specimen(s) Removed:   No specimens collected during this procedure.    Disposition:   Recover and home         Electronically signed by: IJEOMA Motley, 9/19/2024 12:31 PM

## 2024-09-19 NOTE — PRE-SEDATION DOCUMENTATION
Interventional Cardiology Preprocedure Note    Yoanna YEYO Samuel   Indication for procedure: The encounter diagnosis was Abnormal computed tomography angiography (CTA). Patient with chest heaviness.     Relevant review of systems:  NA      /80   Pulse 87   Temp 36.7 °C (98.1 °F)   Resp 12   Ht 1.524 m (5')   Wt 74.8 kg (165 lb)   LMP  (LMP Unknown)   SpO2 95%   BMI 32.22 kg/m²    Relevant Labs:   Lab Results   Component Value Date    CREATININE 0.63 09/11/2024    EGFR >90 09/11/2024       Planned Sedation/Anesthesia: Moderate    Airway assessment: normal    Physical Exam  Vitals reviewed.   Constitutional:       Appearance: Normal appearance.   HENT:      Head: Normocephalic.      Nose: Nose normal.   Cardiovascular:      Rate and Rhythm: Normal rate and regular rhythm.      Pulses: Normal pulses.      Heart sounds: Normal heart sounds.   Pulmonary:      Effort: Pulmonary effort is normal.      Breath sounds: Normal breath sounds.   Abdominal:      Palpations: Abdomen is soft.   Musculoskeletal:      Right lower leg: No edema.      Left lower leg: No edema.   Skin:     General: Skin is warm and dry.      Capillary Refill: Capillary refill takes less than 2 seconds.   Neurological:      General: No focal deficit present.      Mental Status: She is alert and oriented to person, place, and time.   Psychiatric:         Mood and Affect: Mood normal.         Behavior: Behavior normal.         Mallampati: II (hard and soft palate, upper portion of tonsils and uvula visible)    ASA Score: ASA 2 - Patient with mild systemic disease with no functional limitations    Benefits, risks and alternatives of procedure and planned sedation have been discussed with the patient and/or their representative. All questions answered and they agree to proceed.     Elida Rooney, APRN-CNP

## 2024-09-30 ENCOUNTER — OFFICE VISIT (OUTPATIENT)
Dept: CARDIOLOGY | Facility: HOSPITAL | Age: 69
End: 2024-09-30
Payer: MEDICARE

## 2024-09-30 VITALS
BODY MASS INDEX: 33.18 KG/M2 | DIASTOLIC BLOOD PRESSURE: 74 MMHG | SYSTOLIC BLOOD PRESSURE: 128 MMHG | HEART RATE: 78 BPM | WEIGHT: 169 LBS | HEIGHT: 60 IN | OXYGEN SATURATION: 98 %

## 2024-09-30 DIAGNOSIS — I10 BENIGN ESSENTIAL HYPERTENSION: ICD-10-CM

## 2024-09-30 DIAGNOSIS — E78.2 MIXED HYPERLIPIDEMIA: ICD-10-CM

## 2024-09-30 PROCEDURE — 3008F BODY MASS INDEX DOCD: CPT | Performed by: INTERNAL MEDICINE

## 2024-09-30 PROCEDURE — 3078F DIAST BP <80 MM HG: CPT | Performed by: INTERNAL MEDICINE

## 2024-09-30 PROCEDURE — 3061F NEG MICROALBUMINURIA REV: CPT | Performed by: INTERNAL MEDICINE

## 2024-09-30 PROCEDURE — 3048F LDL-C <100 MG/DL: CPT | Performed by: INTERNAL MEDICINE

## 2024-09-30 PROCEDURE — 99212 OFFICE O/P EST SF 10 MIN: CPT | Performed by: INTERNAL MEDICINE

## 2024-09-30 PROCEDURE — 3044F HG A1C LEVEL LT 7.0%: CPT | Performed by: INTERNAL MEDICINE

## 2024-09-30 PROCEDURE — 1157F ADVNC CARE PLAN IN RCRD: CPT | Performed by: INTERNAL MEDICINE

## 2024-09-30 PROCEDURE — 1123F ACP DISCUSS/DSCN MKR DOCD: CPT | Performed by: INTERNAL MEDICINE

## 2024-09-30 PROCEDURE — 1159F MED LIST DOCD IN RCRD: CPT | Performed by: INTERNAL MEDICINE

## 2024-09-30 PROCEDURE — 3074F SYST BP LT 130 MM HG: CPT | Performed by: INTERNAL MEDICINE

## 2024-09-30 NOTE — PATIENT INSTRUCTIONS
"Continue all current medications as prescribed.  Your \"bad\" cholesterol is currently at 97. In your case, we would like to see this at or below 70. To help lower this value, increase your physical activity, monitor your diet and remain compliant with your prescribed medications. Watch carbohydrate intake (rice, potatoes, pasta, bread, ice-cream all within moderation); increase greens, veggies, fiber, lean protein (fish, turkey, chicken; baked/boiled/grilled, not fried) and fruit.   Please have repeat blood work drawn in 3 months to followup on your cholesterol. You will be notified of the results once they become available.    Followup with Dr. Campuzano in 1 year, sooner should any issues or concerns arise before then.     If you have any questions or cardiac concerns, please call our office at [phone].   "

## 2024-09-30 NOTE — PROGRESS NOTES
Counseling:  The patient was counseled regarding diagnostic results, instructions for management, risk factor reductions, prognosis, patient and family education, impressions, risks and benefits of treatment options and importance of compliance with treatment.      Chief Complaint:  The patient presents today for 1-month followup of elevated CT calcium score s/p CTA coronary arteries and subsequent LHC.      History Of Present Illness:    Yoanna Baker is a 68 y.o. female patient who presents today in the company of her  for 1-month followup of elevated CT calcium score s/p CTA coronary arteries and subsequent LHC. Her PMH is significant for HTN, hyperlipidemia, DM type 2 and GERD. CTA of the coronary arteries performed 09/09/2024 revealed normal coronary anatomy with evidence of mild atherosclerotic disease in the LAD with 30-40% stenosis and abnormal CT FFR in the distal LAD of 0.75. Based on these findings, the patient was taken to the cath lab on 09/19/2024, which revealed moderate non-obstructive CAD. Today, the patient states that she is feeling well, having recovered from LHC.    Past Surgical History:  She has a past surgical history that includes Other surgical history (07/08/2021); Other surgical history (07/08/2021); Other surgical history (07/08/2021); Other surgical history (07/19/2022); and Cardiac catheterization (N/A, 9/19/2024).      Social History:  She reports that she has quit smoking. Her smoking use included cigarettes. She has never used smokeless tobacco. She reports that she does not drink alcohol and does not use drugs.    Family History:  Family History   Problem Relation Name Age of Onset    COPD Mother      Other (bleeding ulcer) Father      Diabetes Other grandfather     Heart attack Other grandmother     Rheum arthritis Other grandmother     Stroke Other grandmother         Allergies:  Patient has no known allergies.    Outpatient Medications:  Current Outpatient  Medications   Medication Instructions    alendronate (FOSAMAX) 70 mg, oral, Every 7 days, Take in the morning with a full glass of water, on an empty stomach, and do not take anything else by mouth or lie down for the next 30 min.    aspirin 81 mg, oral, Daily    Auvi-Q 0.3 mg, injection, As needed, Inject into upper leg. Call 911 after use.    dexlansoprazole (DEXILANT) 60 mg, oral, Daily    dicyclomine (BENTYL) 10 mg, oral, Every 6 hours PRN    EPINEPHrine (EPIPEN) 0.3 mg, intramuscular, Once as needed, Inject into upper leg. Call 911 after use.    ezetimibe (ZETIA) 10 mg, oral, Daily    glipiZIDE XL (Glucotrol XL) 5 mg 24 hr tablet 2 tablets, oral, Daily    latanoprost (Xalatan) 0.005 % ophthalmic solution ophthalmic (eye)    linaGLIPtin (TRADJENTA) 5 mg, oral, Daily    metoprolol tartrate (Lopressor) 50 mg tablet Take 1 tablet a day before and take one day of thetest    Ozempic 2 mg    Repatha SureClick 140 mg, subcutaneous, Every 14 days    timolol (Timoptic) 0.5 % ophthalmic solution 1 drop, Both Eyes, 2 times daily    Toujeo SoloStar U-300 Insulin 23 Units, subcutaneous, Daily        Last Recorded Vitals:  There were no vitals filed for this visit.      Review of Systems   All other systems reviewed and are negative.     Physical Exam:  Constitutional:       Appearance: Healthy appearance. Not in distress.   Neck:      Vascular: No JVR. JVD normal.   Pulmonary:      Effort: Pulmonary effort is normal.      Breath sounds: Normal breath sounds. No wheezing. No rhonchi. No rales.   Chest:      Chest wall: Not tender to palpatation.   Cardiovascular:      PMI at left midclavicular line. Normal rate. Regular rhythm. Normal S1. Normal S2.       Murmurs: There is no murmur.      No gallop.  No click. No rub.   Pulses:     Intact distal pulses.   Edema:     Peripheral edema absent.   Abdominal:      General: Bowel sounds are normal.      Palpations: Abdomen is soft.      Tenderness: There is no abdominal tenderness.    Musculoskeletal: Normal range of motion.         General: No tenderness. Skin:     General: Skin is warm and dry.   Neurological:      General: No focal deficit present.      Mental Status: Alert and oriented to person, place and time.            Last Labs:  CBC -  Lab Results   Component Value Date    WBC 7.6 09/11/2024    HGB 15.0 09/11/2024    HCT 45.0 09/11/2024    MCV 92 09/11/2024     09/11/2024       CMP -  Lab Results   Component Value Date    CALCIUM 8.2 (L) 09/11/2024    PROT 6.1 (L) 09/11/2024    ALBUMIN 4.0 09/11/2024    AST 18 09/11/2024    ALT 30 09/11/2024    ALKPHOS 57 09/11/2024    BILITOT 1.3 (H) 09/11/2024       LIPID PANEL -   Lab Results   Component Value Date    CHOL 171 04/30/2024    TRIG 139 04/30/2024    HDL 46.4 04/30/2024    CHHDL 3.7 04/30/2024    VLDL 28 04/30/2024    NHDL 125 04/30/2024       RENAL FUNCTION PANEL -   Lab Results   Component Value Date    GLUCOSE 154 (H) 09/11/2024     09/11/2024    K 4.0 09/11/2024     09/11/2024    CO2 27 09/11/2024    ANIONGAP 9 (L) 09/11/2024    BUN 19 09/11/2024    CREATININE 0.63 09/11/2024    GFRMALE CANCELED 03/29/2023    CALCIUM 8.2 (L) 09/11/2024    ALBUMIN 4.0 09/11/2024        Lab Results   Component Value Date    HGBA1C 6.1 (H) 04/30/2024       Last Cardiology Tests:  09/19/2024 - Cardiac Catheterization (LH)  1. Moderate non-obstructive coronary artery disease.  2. The 1st diagonal branch showed moderate atherosclerotic disease and diffuse atherosclerotic disease.  3. Left Ventricular end-diastolic pressure = 9.  4. Normal LV filling pressures.    09/09/2024 - CTA Coronary Arteries  Normal coronary anatomy with evidence of mild atherosclerotic disease in LAD with 30-40% stenosis. CT FFR analysis was performed, while the proximal segments show normal CT FFR, in the distal LAD this was noted to be abnormal at 0.75. However anatomically there is no stenosis in the distal segment, making this finding of uncertain  significance.    07/24/2024 - CT Calcium Score  1. Total   73.85.  2. The visualized mid/lower ascending thoracic aorta, is normal in size, measuring 2.8 cm in diameter. The heart is normal in size. No pericardial effusion is present.Main pulmonary artery is normal in caliber.  3. There is no evidence of lymphadenopathy or mass within the visualized mediastinum.The visualized esophagus is unremarkable.  4. The visualized segments of the lungsare normally expanded.  5. The visualized subdiaphragmatic structures demonstrate no remarkable findings.    Diagnostic review: I have personally reviewed the result(s) of the CTA Coronary Arteries and C.    Assessment/Plan   1) Elevated CT Calcium Score in a Patient with Hyperlipidemia - Mild CAD by Cath  On ASA 81 mg daily, Repatha 140 mg/mL SQ every 2 weeks, Zetia 10 mg daily  Intolerant of statins  Total score of 73.85  Lipid panel 04/30/2024 with total cholesterol, LDL and triglycerides of 171, 97 and 139 respectively  Denies CP, chest discomfort or SOB currently  Reports an episode of upper chest pain with radiation to neck and around head with associated dizziness/lightheadedness and gait imbalance - presented to the ED with negative workup.   Per the patient, she had a remote heart cath that showed 40% stenosis (unknown vessel)  Denies any personal past history of heart disease  H/O HTN, hyperlipidemia and diabetes  CTA coronary arteries 09/09/2024 with normal coronary anatomy with evidence of mild atherosclerotic disease in LAD with 30-40% stenosis and abnormal CT FFR of 0.75  LHC 09/19/2024 with moderate non-obstructive CAD; 1st diagonal branch showed moderate atherosclerotic disease and diffuse atherosclerotic disease.  Asymptomatic  Aggressive secondary risk factor modification to achieve LDL goal of <70 - now on Repatha   Continue current medical Rx   Repeat CMP and Lipid Panel in 3 months  F/U 1 year       Scribe Attestation  By signing my name below, I, Desire  Jackson Miller   attest that this documentation has been prepared under the direction and in the presence of Hugo Campuzano MD.

## 2024-10-29 ENCOUNTER — APPOINTMENT (OUTPATIENT)
Dept: PRIMARY CARE | Facility: CLINIC | Age: 69
End: 2024-10-29
Payer: MEDICARE

## 2024-10-29 ENCOUNTER — HOSPITAL ENCOUNTER (OUTPATIENT)
Dept: RADIOLOGY | Facility: HOSPITAL | Age: 69
Discharge: HOME | End: 2024-10-29
Payer: MEDICARE

## 2024-10-29 VITALS
WEIGHT: 169 LBS | OXYGEN SATURATION: 97 % | HEIGHT: 60 IN | SYSTOLIC BLOOD PRESSURE: 124 MMHG | BODY MASS INDEX: 33.18 KG/M2 | DIASTOLIC BLOOD PRESSURE: 78 MMHG | HEART RATE: 86 BPM | RESPIRATION RATE: 16 BRPM

## 2024-10-29 DIAGNOSIS — E78.2 MIXED HYPERLIPIDEMIA: ICD-10-CM

## 2024-10-29 DIAGNOSIS — G89.29 CHRONIC LEFT SHOULDER PAIN: Primary | ICD-10-CM

## 2024-10-29 DIAGNOSIS — M81.6 LOCALIZED OSTEOPOROSIS WITHOUT CURRENT PATHOLOGICAL FRACTURE: ICD-10-CM

## 2024-10-29 DIAGNOSIS — M25.512 CHRONIC LEFT SHOULDER PAIN: Primary | ICD-10-CM

## 2024-10-29 DIAGNOSIS — E11.9 TYPE 2 DIABETES MELLITUS WITHOUT COMPLICATION, WITHOUT LONG-TERM CURRENT USE OF INSULIN (MULTI): ICD-10-CM

## 2024-10-29 DIAGNOSIS — G89.29 CHRONIC LEFT SHOULDER PAIN: ICD-10-CM

## 2024-10-29 DIAGNOSIS — I10 BENIGN ESSENTIAL HYPERTENSION: ICD-10-CM

## 2024-10-29 DIAGNOSIS — M25.512 CHRONIC LEFT SHOULDER PAIN: ICD-10-CM

## 2024-10-29 DIAGNOSIS — E66.01 OBESITY, MORBID (MULTI): ICD-10-CM

## 2024-10-29 PROCEDURE — 3008F BODY MASS INDEX DOCD: CPT | Performed by: FAMILY MEDICINE

## 2024-10-29 PROCEDURE — 1160F RVW MEDS BY RX/DR IN RCRD: CPT | Performed by: FAMILY MEDICINE

## 2024-10-29 PROCEDURE — 3078F DIAST BP <80 MM HG: CPT | Performed by: FAMILY MEDICINE

## 2024-10-29 PROCEDURE — 1157F ADVNC CARE PLAN IN RCRD: CPT | Performed by: FAMILY MEDICINE

## 2024-10-29 PROCEDURE — 3048F LDL-C <100 MG/DL: CPT | Performed by: FAMILY MEDICINE

## 2024-10-29 PROCEDURE — 99214 OFFICE O/P EST MOD 30 MIN: CPT | Performed by: FAMILY MEDICINE

## 2024-10-29 PROCEDURE — 3044F HG A1C LEVEL LT 7.0%: CPT | Performed by: FAMILY MEDICINE

## 2024-10-29 PROCEDURE — 1036F TOBACCO NON-USER: CPT | Performed by: FAMILY MEDICINE

## 2024-10-29 PROCEDURE — 1159F MED LIST DOCD IN RCRD: CPT | Performed by: FAMILY MEDICINE

## 2024-10-29 PROCEDURE — 3074F SYST BP LT 130 MM HG: CPT | Performed by: FAMILY MEDICINE

## 2024-10-29 PROCEDURE — 1123F ACP DISCUSS/DSCN MKR DOCD: CPT | Performed by: FAMILY MEDICINE

## 2024-10-29 PROCEDURE — 73030 X-RAY EXAM OF SHOULDER: CPT | Mod: LT

## 2024-10-29 PROCEDURE — 3061F NEG MICROALBUMINURIA REV: CPT | Performed by: FAMILY MEDICINE

## 2024-10-29 RX ORDER — INSULIN GLARGINE 300 [IU]/ML
26 INJECTION, SOLUTION SUBCUTANEOUS EVERY MORNING
Status: SHIPPED | COMMUNITY
Start: 2024-10-29

## 2024-10-29 ASSESSMENT — ANXIETY QUESTIONNAIRES
4. TROUBLE RELAXING: NOT AT ALL
2. NOT BEING ABLE TO STOP OR CONTROL WORRYING: NOT AT ALL
5. BEING SO RESTLESS THAT IT IS HARD TO SIT STILL: NOT AT ALL
GAD7 TOTAL SCORE: 0
3. WORRYING TOO MUCH ABOUT DIFFERENT THINGS: NOT AT ALL
IF YOU CHECKED OFF ANY PROBLEMS ON THIS QUESTIONNAIRE, HOW DIFFICULT HAVE THESE PROBLEMS MADE IT FOR YOU TO DO YOUR WORK, TAKE CARE OF THINGS AT HOME, OR GET ALONG WITH OTHER PEOPLE: NOT DIFFICULT AT ALL
6. BECOMING EASILY ANNOYED OR IRRITABLE: NOT AT ALL
1. FEELING NERVOUS, ANXIOUS, OR ON EDGE: NOT AT ALL
7. FEELING AFRAID AS IF SOMETHING AWFUL MIGHT HAPPEN: NOT AT ALL

## 2024-10-29 ASSESSMENT — COLUMBIA-SUICIDE SEVERITY RATING SCALE - C-SSRS
1. IN THE PAST MONTH, HAVE YOU WISHED YOU WERE DEAD OR WISHED YOU COULD GO TO SLEEP AND NOT WAKE UP?: NO
6. HAVE YOU EVER DONE ANYTHING, STARTED TO DO ANYTHING, OR PREPARED TO DO ANYTHING TO END YOUR LIFE?: NO
2. HAVE YOU ACTUALLY HAD ANY THOUGHTS OF KILLING YOURSELF?: NO

## 2024-10-29 ASSESSMENT — ENCOUNTER SYMPTOMS
DEPRESSION: 0
LOSS OF SENSATION IN FEET: 0
OCCASIONAL FEELINGS OF UNSTEADINESS: 0

## 2024-11-25 ENCOUNTER — APPOINTMENT (OUTPATIENT)
Dept: ORTHOPEDIC SURGERY | Facility: CLINIC | Age: 69
End: 2024-11-25
Payer: MEDICARE

## 2024-11-25 VITALS — HEIGHT: 60 IN | WEIGHT: 169 LBS | BODY MASS INDEX: 33.18 KG/M2

## 2024-11-25 DIAGNOSIS — M25.512 CHRONIC LEFT SHOULDER PAIN: ICD-10-CM

## 2024-11-25 DIAGNOSIS — G89.29 CHRONIC LEFT SHOULDER PAIN: ICD-10-CM

## 2024-11-25 DIAGNOSIS — M75.02 ADHESIVE CAPSULITIS OF LEFT SHOULDER: ICD-10-CM

## 2024-11-25 PROCEDURE — 3061F NEG MICROALBUMINURIA REV: CPT | Performed by: STUDENT IN AN ORGANIZED HEALTH CARE EDUCATION/TRAINING PROGRAM

## 2024-11-25 PROCEDURE — 1036F TOBACCO NON-USER: CPT | Performed by: STUDENT IN AN ORGANIZED HEALTH CARE EDUCATION/TRAINING PROGRAM

## 2024-11-25 PROCEDURE — 99204 OFFICE O/P NEW MOD 45 MIN: CPT | Performed by: STUDENT IN AN ORGANIZED HEALTH CARE EDUCATION/TRAINING PROGRAM

## 2024-11-25 PROCEDURE — 1159F MED LIST DOCD IN RCRD: CPT | Performed by: STUDENT IN AN ORGANIZED HEALTH CARE EDUCATION/TRAINING PROGRAM

## 2024-11-25 PROCEDURE — 1123F ACP DISCUSS/DSCN MKR DOCD: CPT | Performed by: STUDENT IN AN ORGANIZED HEALTH CARE EDUCATION/TRAINING PROGRAM

## 2024-11-25 PROCEDURE — 3044F HG A1C LEVEL LT 7.0%: CPT | Performed by: STUDENT IN AN ORGANIZED HEALTH CARE EDUCATION/TRAINING PROGRAM

## 2024-11-25 PROCEDURE — 1157F ADVNC CARE PLAN IN RCRD: CPT | Performed by: STUDENT IN AN ORGANIZED HEALTH CARE EDUCATION/TRAINING PROGRAM

## 2024-11-25 PROCEDURE — 1125F AMNT PAIN NOTED PAIN PRSNT: CPT | Performed by: STUDENT IN AN ORGANIZED HEALTH CARE EDUCATION/TRAINING PROGRAM

## 2024-11-25 PROCEDURE — 1160F RVW MEDS BY RX/DR IN RCRD: CPT | Performed by: STUDENT IN AN ORGANIZED HEALTH CARE EDUCATION/TRAINING PROGRAM

## 2024-11-25 PROCEDURE — 3008F BODY MASS INDEX DOCD: CPT | Performed by: STUDENT IN AN ORGANIZED HEALTH CARE EDUCATION/TRAINING PROGRAM

## 2024-11-25 PROCEDURE — 3048F LDL-C <100 MG/DL: CPT | Performed by: STUDENT IN AN ORGANIZED HEALTH CARE EDUCATION/TRAINING PROGRAM

## 2024-11-25 ASSESSMENT — PAIN - FUNCTIONAL ASSESSMENT: PAIN_FUNCTIONAL_ASSESSMENT: 0-10

## 2024-11-25 ASSESSMENT — PAIN SCALES - GENERAL: PAINLEVEL_OUTOF10: 4

## 2024-11-25 NOTE — PROGRESS NOTES
PRIMARY CARE PHYSICIAN: No primary care provider on file.  REFERRING PROVIDER: Leeanne Gutierrez MD  [address] [facility] [address], [address]  [place] [address]     CONSULT ORTHOPAEDIC: Shoulder Evaluation    ASSESSMENT & PLAN    Impression: 69 y.o. female with left shoulder adhesive capsulitis.    Plan:   I explained to the patient the nature of their diagnosis.  I reviewed their imaging studies with them.    Based on the history, physical exam and imaging studies above, the patient's presentation is consistent with the above diagnosis.  I had a long discussion with the patient regarding their presentation and the treatment options.  We discussed initial nonoperative versus operative management options as well as potential further diagnostic imaging.  I reviewed the patient's x-ray findings with her.  We discussed initial nonoperative management for adhesive capsulitis.  I reviewed the 3 phases of adhesive capsulitis including the importance of time in resolution of this.  We discussed nonoperative management options.  I provided her with a referral to my colleague for ultrasound-guided injection into the glenohumeral joint for adhesive capsulitis.  I provided her with a home exercise program for adhesive capsulitis stretching protocol as well as a referral to physical therapy for the same.    Follow-Up: Patient will follow-up as needed    At the end of the visit, all questions were answered in full. The patient is in agreement with the plan and recommendations. They will call the office with any questions/concerns.    Note dictated with [employer] software. Completed without full typed error editing and sent to avoid delay.       SUBJECTIVE  CHIEF COMPLAINT:   Chief Complaint   Patient presents with    Left Shoulder - Pain        HPI: Yoanna Baker is a 69 y.o. patient who presents today with left shoulder pain and ROM deficits for the past 1 year. Pain goes from the  shoulder to the elbow.  She notes progressively worsening range of motion as well as associated pain.  She has difficulty with any sort of arm extended or overhead activities as well as reaching around her back.  No Hx of Sx, trauma, or injections to the area. She is a type 2 diabetic. XR done 10/29/2024.     They deny any associated neck pain.  No numbness, tingling, or paresthesias.    REVIEW OF SYSTEMS  Constitutional: See HPI for pain assessment, No significant weight loss, recent trauma  Cardiovascular: No chest pain, shortness of breath  Respiratory: No difficulty breathing, cough  Gastrointestinal: No nausea, vomiting, diarrhea, constipation  Musculoskeletal: Noted in HPI, positive for pain, restricted motion, stiffness  Integumentary: No rashes, easy bruising, redness   Neurological: no numbness or tingling in extremities, no gait disturbances   Psychiatric: No mood changes, memory changes, social issues  Heme/Lymph: no excessive swelling, easy bruising, excessive bleeding  ENT: No hearing changes  Eyes: No vision changes    Past Medical History:   Diagnosis Date    Altered bowel function 2024    Altered bowel function 2024    Lower abdominal pain 2022    Muscle weakness of extremity 2024    Rectal hemorrhage 2022        No Known Allergies     Past Surgical History:   Procedure Laterality Date    CARDIAC CATHETERIZATION N/A 2024    Procedure: Left Heart Cath;  Surgeon: Huog Campuzano MD;  [facility];  Service: Cardiovascular;  Laterality: N/A;    OTHER SURGICAL HISTORY  2021     section    OTHER SURGICAL HISTORY  2021    Cholecystectomy    OTHER SURGICAL HISTORY  2021    Hysterectomy    OTHER SURGICAL HISTORY  2022    Carpal tunnel surgery        Family History   Problem Relation Name Age of Onset    COPD Mother      Other (bleeding ulcer) Father      Diabetes Other grandfather     Heart attack Other grandmother     Rheum  arthritis Other grandmother     Stroke Other grandmother         Social History     Socioeconomic History    Marital status:      Spouse name: Not on file    Number of children: Not on file    Years of education: Not on file    Highest education level: Not on file   Occupational History    Not on file   Tobacco Use    Smoking status: Former     Types: Cigarettes    Smokeless tobacco: Never   Vaping Use    Vaping status: Never Used   Substance and Sexual Activity    Alcohol use: Never    Drug use: Never    Sexual activity: Not on file   Other Topics Concern    Not on file   Social History Narrative    Not on file     Social Drivers of Health     Financial Resource Strain: Not on file   Food Insecurity: Not on file   Transportation Needs: Not on file   Physical Activity: Not on file   Stress: Not on file   Social Connections: Not on file   Intimate Partner Violence: Not on file   Housing Stability: Not on file        CURRENT MEDICATIONS:   Current Outpatient Medications   Medication Sig Dispense Refill    alendronate (Fosamax) 70 mg tablet Take 1 tablet (70 mg) by mouth every 7 days. Take in the morning with a full glass of water, on an empty stomach, and do not take anything else by mouth or lie down for the next 30 min. 4 tablet 11    aspirin 81 mg EC tablet Take 1 tablet (81 mg) by mouth once daily.      Auvi-Q 0.3 mg/0.3 mL injection syringe Inject 0.3 mL (0.3 mg) as directed if needed for anaphylaxis. Inject into upper leg. Call 911 after use. 1 each 5    dexlansoprazole (Dexilant) 60 mg DR capsule take 1 capsule by mouth once daily 90 capsule 3    dicyclomine (Bentyl) 10 mg capsule Take 1 capsule (10 mg) by mouth every 6 hours if needed.      EPINEPHrine (Epipen) 0.3 mg/0.3 mL injection syringe Inject 0.3 mL (0.3 mg) into the muscle 1 time if needed for anaphylaxis for up to 2 doses. Inject into upper leg. Call 911 after use. 2 each 0    evolocumab (Repatha SureClick) 140 mg/mL injection Inject 1 mL (140  mg) under the skin every 14 (fourteen) days. 2 mL 11    glipiZIDE XL (Glucotrol XL) 5 mg 24 hr tablet Take 2 tablets (10 mg) by mouth once daily.      insulin glargine (Toujeo SoloStar U-300 Insulin) 300 unit/mL (1.5 mL) injection Inject 26 Units under the skin once daily in the morning.      latanoprost (Xalatan) 0.005 % ophthalmic solution Administer into affected eye(s).      Ozempic 2 mg/dose (8 mg/3 mL) pen injector 2 mg.      timolol (Timoptic) 0.5 % ophthalmic solution Administer 1 drop into both eyes 2 times a day.       No current facility-administered medications for this visit.        OBJECTIVE    PHYSICAL EXAM      9/9/2024    12:30 PM 9/9/2024     2:11 PM 9/19/2024     9:06 AM 9/19/2024    10:41 AM 9/19/2024    10:56 AM 9/30/2024    12:57 PM 10/29/2024    11:45 AM   Vitals   Systolic 124 121 140 182 140 128 124   Diastolic 66 65 76 81 80 74 78   BP Location      Left arm    Heart Rate 68 66 71 63 87 78 86   Temp   36.7 °C (98.1 °F)       Resp   16 12 12  16   Height   1.524 m (5')   1.524 m (5') 1.524 m (5')   Weight (lb)   165   169 169   BMI   32.22 kg/m2   33.01 kg/m2 33.01 kg/m2   BSA (m2)   1.78 m2   1.8 m2 1.8 m2   Visit Report      Report Report      There is no height or weight on file to calculate BMI.    GENERAL: A/Ox3, NAD. Appears healthy, well nourished  PSYCHIATRIC: Mood stable, appropriate memory recall  EYES: EOM intact, no scleral icterus  CARDIOVASCULAR: Palpable peripheral pulses  LUNGS: Breathing non-labored on room air  SKIN: no erythema, rashes, or ecchymosis     MUSCULOSKELETAL:  Laterality: left Shoulder Exam  - Negative Spurlings, full painless neck ROM  - Skin intact  - No erythema or warmth  - No ecchymosis or soft tissue swelling  - Shoulder ROM: Forward flexion 140, ER 30, IR mid lumbar  - Strength:      Jobes 5-/5     ER 5-/5     Belly press and bearhug 5-/5  - Palpation: Positive tenderness biceps groove and posterolateral acromion  - Wheeler and Neers positive  - Speeds  and O'Briens positive  - Stability: Anterior/Posterior load and shift stable    NEUROVASCULAR:  - Neurovascular Status: sensation intact to light touch distally, upper extremity motor grossly intact  - Capillary refill brisk at extremities, Bilateral peripheral pulses 2+    Imaging: Multiple views of the affected left shoulder(s) demonstrate: No significant osseous normality, no fracture, no significant degenerative change.   X-rays were personally reviewed and interpreted by me.  Radiology reports were reviewed by me as well, if readily available at the time.      Baldev George MD  Attending Surgeon    Sports Medicine Orthopaedic Surgery  [facility] [place]  [facility]

## 2024-12-06 ENCOUNTER — HOSPITAL ENCOUNTER (OUTPATIENT)
Dept: RADIOLOGY | Facility: EXTERNAL LOCATION | Age: 69
Discharge: HOME | End: 2024-12-06

## 2024-12-06 ENCOUNTER — APPOINTMENT (OUTPATIENT)
Dept: ORTHOPEDIC SURGERY | Facility: CLINIC | Age: 69
End: 2024-12-06
Payer: MEDICARE

## 2024-12-06 VITALS — BODY MASS INDEX: 32.59 KG/M2 | WEIGHT: 166 LBS | HEIGHT: 60 IN

## 2024-12-06 DIAGNOSIS — M75.02 ADHESIVE CAPSULITIS OF LEFT SHOULDER: Primary | ICD-10-CM

## 2024-12-06 PROCEDURE — 99204 OFFICE O/P NEW MOD 45 MIN: CPT | Performed by: EMERGENCY MEDICINE

## 2024-12-06 PROCEDURE — 3008F BODY MASS INDEX DOCD: CPT | Performed by: EMERGENCY MEDICINE

## 2024-12-06 PROCEDURE — 1036F TOBACCO NON-USER: CPT | Performed by: EMERGENCY MEDICINE

## 2024-12-06 PROCEDURE — 3044F HG A1C LEVEL LT 7.0%: CPT | Performed by: EMERGENCY MEDICINE

## 2024-12-06 PROCEDURE — 20611 DRAIN/INJ JOINT/BURSA W/US: CPT | Performed by: EMERGENCY MEDICINE

## 2024-12-06 PROCEDURE — 3048F LDL-C <100 MG/DL: CPT | Performed by: EMERGENCY MEDICINE

## 2024-12-06 PROCEDURE — 1123F ACP DISCUSS/DSCN MKR DOCD: CPT | Performed by: EMERGENCY MEDICINE

## 2024-12-06 PROCEDURE — 1157F ADVNC CARE PLAN IN RCRD: CPT | Performed by: EMERGENCY MEDICINE

## 2024-12-06 PROCEDURE — 1125F AMNT PAIN NOTED PAIN PRSNT: CPT | Performed by: EMERGENCY MEDICINE

## 2024-12-06 PROCEDURE — 3061F NEG MICROALBUMINURIA REV: CPT | Performed by: EMERGENCY MEDICINE

## 2024-12-06 PROCEDURE — 1159F MED LIST DOCD IN RCRD: CPT | Performed by: EMERGENCY MEDICINE

## 2024-12-06 RX ORDER — LIDOCAINE HYDROCHLORIDE 10 MG/ML
2 INJECTION, SOLUTION INFILTRATION; PERINEURAL
Status: COMPLETED | OUTPATIENT
Start: 2024-12-06 | End: 2024-12-06

## 2024-12-06 RX ORDER — METHYLPREDNISOLONE ACETATE 40 MG/ML
80 INJECTION, SUSPENSION INTRA-ARTICULAR; INTRALESIONAL; INTRAMUSCULAR; SOFT TISSUE
Status: COMPLETED | OUTPATIENT
Start: 2024-12-06 | End: 2024-12-06

## 2024-12-06 ASSESSMENT — PAIN SCALES - GENERAL: PAINLEVEL_OUTOF10: 4

## 2024-12-06 ASSESSMENT — PAIN - FUNCTIONAL ASSESSMENT: PAIN_FUNCTIONAL_ASSESSMENT: 0-10

## 2024-12-06 NOTE — PROGRESS NOTES
Subjective    Patient ID: Yoanna Baker is a 69 y.o. female.    Chief Complaint: Pain of the Left Shoulder ( left shoulder pain and ROM deficits for the past 1 year. Pain goes from the shoulder to the elbow.  She notes progressively worsening range of motion as well as associated pain.  She has difficulty with any sort of arm extended or overhead activities, reaching around her back, and internally rotating her shoulder.  No Hx of Sx, trauma, or injections to the area. Denies neck pain. Denies numbness/tingling. She is a type 2 diabetic. XR done 10/29/2024. PT at [place], [place]/Referred by: Dr. George )     Last Surgery: No surgery found  Last Surgery Date: No surgery found    Ms. Baker is a very pleasant 69-year-old female coming in with some acute on chronic left shoulder pain and decreased range of motion.  She was diagnosed with adhesive capsulitis by her orthopedic surgeon, Dr. George, who referred her here for ultrasound-guided injections to help treat her left shoulder nonoperatively.  She is scheduled to start physical therapy on the 11th.  She has GI issues so she is not able to take oral NSAIDs.  She denies any trauma and states that her pain first began about a year ago.  She has noticed worsening symptoms with diffuse left shoulder pain that is moderate, worse with activity, and slightly better with rest.  Most of her symptoms are reproduced with shoulder abduction and internal rotation consistent with some impingement as well.  She has no infectious symptoms.  She really wants to avoid surgery if at all possible.  Of note, she was joking that she needs this fixed because she is mean to her  and they are trying to make it to their 50th wedding anniversary next June.        Objective   Right Shoulder Exam   Right shoulder exam is normal.      Left Shoulder Exam     Tenderness   The patient is experiencing no tenderness.     Range of Motion   Active abduction:  80 abnormal   Passive  abduction:  90 abnormal   Forward flexion:  90     Muscle Strength   Abduction: 4/5   Internal rotation: 4/5   External rotation: 4/5   Supraspinatus: 4/5   Subscapularis: 4/5   Biceps: 5/5     Tests   Wheeler test: positive  Impingement: positive    Other   Erythema: absent  Sensation: normal  Pulse: present     Comments:  Pain with external range of motion passively            Image Results:  Point of Care Ultrasound  These images are not reportable by radiology and will not be interpreted   by  Radiologists.    X-rays of the left shoulder were reviewed and interpreted by me on 12/6/2024.  No evidence of acute injuries or fractures.  Overall good joint spacing.    Patient ID: Yoanna Baker is a 69 y.o. female.    L Inj/Asp: L glenohumeral on 12/6/2024 9:25 AM  Indications: pain  Details: 22 G needle, ultrasound-guided posterior approach  Medications: 80 mg methylPREDNISolone acetate 40 mg/mL; 2 mL lidocaine 10 mg/mL (1 %)  Outcome: tolerated well, no immediate complications  Procedure, treatment alternatives, risks and benefits explained, specific risks discussed. Consent was given by the patient. Immediately prior to procedure a time out was called to verify the correct patient, procedure, equipment, support staff and site/side marked as required. Patient was prepped and draped in the usual sterile fashion.           Assessment/Plan   Encounter Diagnoses:  Adhesive capsulitis of left shoulder    Orders Placed This Encounter    L Inj/Asp    Point of Care Ultrasound     No follow-ups on file.    We discussed  her treatment options and eventually agreed to perform a left glenohumeral cortisone injection under ultrasound guidance.  The patient tolerated the procedure well without any complications and activity modifications were reviewed.  She is going to start physical therapy on 12/11/2024 and knows to develop and implement a home exercise program.  We went over some exercises that she can begin doing now  and she is also going to start using prescription dose Voltaren gel topically 3 times daily for the next month.  She will then follow-up with me in about 6 weeks to determine her response to this plan and whether or not we need to do a subacromial injection at that time.    ** Please excuse any errors in grammar or translation related to this dictation. Voice recognition software was utilized to prepare this document. **       Jonah Lazo MD  [facility]

## 2024-12-30 ENCOUNTER — LAB (OUTPATIENT)
Dept: LAB | Facility: LAB | Age: 69
End: 2024-12-30
Payer: MEDICARE

## 2024-12-30 DIAGNOSIS — I10 BENIGN ESSENTIAL HYPERTENSION: ICD-10-CM

## 2024-12-30 DIAGNOSIS — E78.2 MIXED HYPERLIPIDEMIA: ICD-10-CM

## 2024-12-30 LAB
ALBUMIN SERPL BCP-MCNC: 4.2 G/DL (ref 3.4–5)
ALP SERPL-CCNC: 44 U/L (ref 33–136)
ALT SERPL W P-5'-P-CCNC: 26 U/L (ref 7–45)
ANION GAP SERPL CALC-SCNC: 12 MMOL/L (ref 10–20)
AST SERPL W P-5'-P-CCNC: 16 U/L (ref 9–39)
BILIRUB SERPL-MCNC: 1.2 MG/DL (ref 0–1.2)
BUN SERPL-MCNC: 20 MG/DL (ref 6–23)
CALCIUM SERPL-MCNC: 9.2 MG/DL (ref 8.6–10.3)
CHLORIDE SERPL-SCNC: 104 MMOL/L (ref 98–107)
CHOLEST SERPL-MCNC: 111 MG/DL (ref 0–199)
CHOLESTEROL/HDL RATIO: 1.8
CO2 SERPL-SCNC: 30 MMOL/L (ref 21–32)
CREAT SERPL-MCNC: 0.68 MG/DL (ref 0.5–1.05)
EGFRCR SERPLBLD CKD-EPI 2021: >90 ML/MIN/1.73M*2
GLUCOSE SERPL-MCNC: 129 MG/DL (ref 74–99)
HDLC SERPL-MCNC: 62.9 MG/DL
LDLC SERPL CALC-MCNC: 31 MG/DL
NON HDL CHOLESTEROL: 48 MG/DL (ref 0–149)
POTASSIUM SERPL-SCNC: 4.3 MMOL/L (ref 3.5–5.3)
PROT SERPL-MCNC: 6.5 G/DL (ref 6.4–8.2)
SODIUM SERPL-SCNC: 142 MMOL/L (ref 136–145)
TRIGL SERPL-MCNC: 86 MG/DL (ref 0–149)
VLDL: 17 MG/DL (ref 0–40)

## 2024-12-30 PROCEDURE — 80061 LIPID PANEL: CPT

## 2024-12-30 PROCEDURE — 80053 COMPREHEN METABOLIC PANEL: CPT

## 2025-01-17 ENCOUNTER — APPOINTMENT (OUTPATIENT)
Dept: ORTHOPEDIC SURGERY | Facility: CLINIC | Age: 70
End: 2025-01-17
Payer: MEDICARE

## 2025-01-17 VITALS — HEIGHT: 60 IN | WEIGHT: 162 LBS | BODY MASS INDEX: 31.8 KG/M2

## 2025-01-17 DIAGNOSIS — M75.02 ADHESIVE CAPSULITIS OF LEFT SHOULDER: Primary | ICD-10-CM

## 2025-01-17 PROCEDURE — 3008F BODY MASS INDEX DOCD: CPT | Performed by: EMERGENCY MEDICINE

## 2025-01-17 PROCEDURE — 1036F TOBACCO NON-USER: CPT | Performed by: EMERGENCY MEDICINE

## 2025-01-17 PROCEDURE — 1123F ACP DISCUSS/DSCN MKR DOCD: CPT | Performed by: EMERGENCY MEDICINE

## 2025-01-17 PROCEDURE — 1157F ADVNC CARE PLAN IN RCRD: CPT | Performed by: EMERGENCY MEDICINE

## 2025-01-17 PROCEDURE — 1159F MED LIST DOCD IN RCRD: CPT | Performed by: EMERGENCY MEDICINE

## 2025-01-17 PROCEDURE — 99213 OFFICE O/P EST LOW 20 MIN: CPT | Performed by: EMERGENCY MEDICINE

## 2025-01-17 ASSESSMENT — PAIN - FUNCTIONAL ASSESSMENT: PAIN_FUNCTIONAL_ASSESSMENT: NO/DENIES PAIN

## 2025-01-17 NOTE — PROGRESS NOTES
Subjective    Patient ID: Yoanna Baker is a 69 y.o. female.    Chief Complaint: Pain of the Left Shoulder (Her shoulder has improved. Glenohumeral injection 12/6/24. She has stopped PT 2 weeks ago due to her insurance issues. Her ROM is good but she is still having weakness. Voltaren Gel PRN. )     Last Surgery: No surgery found  Last Surgery Date: No surgery found    Ms. Baker is a very pleasant 69-year-old female coming in with some acute on chronic left shoulder pain and decreased range of motion.  She was diagnosed with adhesive capsulitis by her orthopedic surgeon, Dr. George, who referred her here for ultrasound-guided injections to help treat her left shoulder nonoperatively.  She is scheduled to start physical therapy on the 11th.  She has GI issues so she is not able to take oral NSAIDs.  She denies any trauma and states that her pain first began about a year ago.  She has noticed worsening symptoms with diffuse left shoulder pain that is moderate, worse with activity, and slightly better with rest.  Most of her symptoms are reproduced with shoulder abduction and internal rotation consistent with some impingement as well.  She has no infectious symptoms.  She really wants to avoid surgery if at all possible.  Of note, she was joking that she needs this fixed because she is mean to her  and they are trying to make it to their 50th wedding anniversary next June. We discussed  her treatment options and eventually agreed to perform a left glenohumeral cortisone injection under ultrasound guidance.  The patient tolerated the procedure well without any complications and activity modifications were reviewed.  She is going to start physical therapy on 12/11/2024 and knows to develop and implement a home exercise program.  We went over some exercises that she can begin doing now and she is also going to start using prescription dose Voltaren gel topically 3 times daily for the next month.  She  will then follow-up with me in about 6 weeks to determine her response to this plan and whether or not we need to do a subacromial injection at that time.    Update: 1/17/2025.  Patient is coming back in for follow-up visit for her acute on chronic left shoulder pain.  She was diagnosed with adhesive capsulitis by her orthopedic surgeon who referred her here and at her last visit on 12/6/2024 we did a glenohumeral cortisone injection under ultrasound guidance.  She has also been doing physical therapy and home exercises and states that overall she has improved by about 80%.  Her active range of motion is still painful and limited.  She ran out of physical therapy sessions or states that her insurance company is questioning whether or not they are indicated.  Otherwise she has no other complaints at this time.  She is here today with her  who is helping provide additional history.    Left Shoulder         Objective   Right Shoulder Exam   Right shoulder exam is normal.      Left Shoulder Exam     Tenderness   The patient is experiencing no tenderness.     Range of Motion   Active abduction:  100 abnormal   Passive abduction:  normal     Muscle Strength   Abduction: 5/5   Internal rotation: 5/5   External rotation: 5/5   Supraspinatus: 5/5   Subscapularis: 5/5   Biceps: 5/5     Tests   Wheeler test: positive  Impingement: positive    Other   Erythema: absent  Sensation: normal  Pulse: present     Comments:  No longer having pain with passive external range of motion.  Weakly positive impingement testing that has improved.  Her rotator cuff strength is now fully intact and I am able to get her fully abducted passively however she is still having pain and limited range of motion with active range of motion testing.  Overall though her exam has dramatically improved.            Image Results:  Point of Care Ultrasound  These images are not reportable by radiology and will not be interpreted   by   Radiologists.    No new imaging    Patient ID: Yoanna Baker is a 69 y.o. female.    Procedures    Assessment/Plan   Encounter Diagnoses:  Adhesive capsulitis of left shoulder    Orders Placed This Encounter    Referral to Physical Therapy     No follow-ups on file.    We discussed her treatment options and all agree that overall she is doing much better.  She states that she is about 80% better compared with her last visit prior to the injection.  I do think that she would benefit from some more physical therapy especially to improve her active range of motion which is still very painful and limited.  She is going to do so and will follow-up with me as needed moving forward.  No indication for a subacromial injection here today.    ** Please excuse any errors in grammar or translation related to this dictation. Voice recognition software was utilized to prepare this document. **       Jonah Lazo MD  [facility] Medicine

## 2025-04-28 ENCOUNTER — APPOINTMENT (OUTPATIENT)
Dept: PRIMARY CARE | Facility: CLINIC | Age: 70
End: 2025-04-28
Payer: MEDICARE

## 2025-04-28 VITALS
RESPIRATION RATE: 16 BRPM | HEIGHT: 60 IN | DIASTOLIC BLOOD PRESSURE: 78 MMHG | SYSTOLIC BLOOD PRESSURE: 126 MMHG | HEART RATE: 84 BPM | OXYGEN SATURATION: 95 % | BODY MASS INDEX: 31.8 KG/M2 | WEIGHT: 162 LBS

## 2025-04-28 DIAGNOSIS — Z12.31 VISIT FOR SCREENING MAMMOGRAM: ICD-10-CM

## 2025-04-28 DIAGNOSIS — L91.8 SKIN TAGS, MULTIPLE ACQUIRED: ICD-10-CM

## 2025-04-28 DIAGNOSIS — E66.811 CLASS 1 OBESITY DUE TO EXCESS CALORIES WITH SERIOUS COMORBIDITY AND BODY MASS INDEX (BMI) OF 31.0 TO 31.9 IN ADULT: ICD-10-CM

## 2025-04-28 DIAGNOSIS — R49.0 HOARSENESS OF VOICE: ICD-10-CM

## 2025-04-28 DIAGNOSIS — E11.9 TYPE 2 DIABETES MELLITUS WITHOUT COMPLICATION, WITHOUT LONG-TERM CURRENT USE OF INSULIN: ICD-10-CM

## 2025-04-28 DIAGNOSIS — Z00.00 ROUTINE GENERAL MEDICAL EXAMINATION AT HEALTH CARE FACILITY: Primary | ICD-10-CM

## 2025-04-28 DIAGNOSIS — J38.3 VOCAL CORDS SWELLING: ICD-10-CM

## 2025-04-28 DIAGNOSIS — K21.9 GASTROESOPHAGEAL REFLUX DISEASE WITHOUT ESOPHAGITIS: ICD-10-CM

## 2025-04-28 DIAGNOSIS — E04.2 MULTIPLE THYROID NODULES: ICD-10-CM

## 2025-04-28 DIAGNOSIS — E78.2 MIXED HYPERLIPIDEMIA: ICD-10-CM

## 2025-04-28 DIAGNOSIS — G89.29 CHRONIC LEFT SHOULDER PAIN: ICD-10-CM

## 2025-04-28 DIAGNOSIS — I10 BENIGN ESSENTIAL HYPERTENSION: ICD-10-CM

## 2025-04-28 DIAGNOSIS — E66.09 CLASS 1 OBESITY DUE TO EXCESS CALORIES WITH SERIOUS COMORBIDITY AND BODY MASS INDEX (BMI) OF 31.0 TO 31.9 IN ADULT: ICD-10-CM

## 2025-04-28 DIAGNOSIS — M25.512 CHRONIC LEFT SHOULDER PAIN: ICD-10-CM

## 2025-04-28 DIAGNOSIS — R49.9 HOARSENESS OR CHANGING VOICE: ICD-10-CM

## 2025-04-28 PROBLEM — E66.01 OBESITY, MORBID (MULTI): Status: RESOLVED | Noted: 2024-04-30 | Resolved: 2025-04-28

## 2025-04-28 LAB — POC HEMOGLOBIN A1C: 6.2 % (ref 4.2–6.5)

## 2025-04-28 PROCEDURE — 1123F ACP DISCUSS/DSCN MKR DOCD: CPT | Performed by: FAMILY MEDICINE

## 2025-04-28 PROCEDURE — 1036F TOBACCO NON-USER: CPT | Performed by: FAMILY MEDICINE

## 2025-04-28 PROCEDURE — 1160F RVW MEDS BY RX/DR IN RCRD: CPT | Performed by: FAMILY MEDICINE

## 2025-04-28 PROCEDURE — 1170F FXNL STATUS ASSESSED: CPT | Performed by: FAMILY MEDICINE

## 2025-04-28 PROCEDURE — 1158F ADVNC CARE PLAN TLK DOCD: CPT | Performed by: FAMILY MEDICINE

## 2025-04-28 PROCEDURE — 1157F ADVNC CARE PLAN IN RCRD: CPT | Performed by: FAMILY MEDICINE

## 2025-04-28 PROCEDURE — 83036 HEMOGLOBIN GLYCOSYLATED A1C: CPT | Performed by: FAMILY MEDICINE

## 2025-04-28 PROCEDURE — 1159F MED LIST DOCD IN RCRD: CPT | Performed by: FAMILY MEDICINE

## 2025-04-28 PROCEDURE — 3074F SYST BP LT 130 MM HG: CPT | Performed by: FAMILY MEDICINE

## 2025-04-28 PROCEDURE — 3008F BODY MASS INDEX DOCD: CPT | Performed by: FAMILY MEDICINE

## 2025-04-28 PROCEDURE — 3044F HG A1C LEVEL LT 7.0%: CPT | Performed by: FAMILY MEDICINE

## 2025-04-28 PROCEDURE — 3078F DIAST BP <80 MM HG: CPT | Performed by: FAMILY MEDICINE

## 2025-04-28 PROCEDURE — G0439 PPPS, SUBSEQ VISIT: HCPCS | Performed by: FAMILY MEDICINE

## 2025-04-28 RX ORDER — TIRZEPATIDE 10 MG/.5ML
INJECTION, SOLUTION SUBCUTANEOUS
COMMUNITY
Start: 2025-04-03

## 2025-04-28 RX ORDER — PEN NEEDLE, DIABETIC 32GX 5/32"
NEEDLE, DISPOSABLE MISCELLANEOUS
COMMUNITY
Start: 2024-12-05

## 2025-04-28 RX ORDER — BLOOD SUGAR DIAGNOSTIC
STRIP MISCELLANEOUS
COMMUNITY
Start: 2025-03-05

## 2025-04-28 ASSESSMENT — ENCOUNTER SYMPTOMS
BACK PAIN: 1
DEPRESSION: 0
COUGH: 1
OCCASIONAL FEELINGS OF UNSTEADINESS: 0
VOICE CHANGE: 1
BRUISES/BLEEDS EASILY: 1
LOSS OF SENSATION IN FEET: 0

## 2025-04-28 ASSESSMENT — ANXIETY QUESTIONNAIRES
7. FEELING AFRAID AS IF SOMETHING AWFUL MIGHT HAPPEN: NOT AT ALL
GAD7 TOTAL SCORE: 0
IF YOU CHECKED OFF ANY PROBLEMS ON THIS QUESTIONNAIRE, HOW DIFFICULT HAVE THESE PROBLEMS MADE IT FOR YOU TO DO YOUR WORK, TAKE CARE OF THINGS AT HOME, OR GET ALONG WITH OTHER PEOPLE: NOT DIFFICULT AT ALL
5. BEING SO RESTLESS THAT IT IS HARD TO SIT STILL: NOT AT ALL
6. BECOMING EASILY ANNOYED OR IRRITABLE: NOT AT ALL
3. WORRYING TOO MUCH ABOUT DIFFERENT THINGS: NOT AT ALL
4. TROUBLE RELAXING: NOT AT ALL
2. NOT BEING ABLE TO STOP OR CONTROL WORRYING: NOT AT ALL
1. FEELING NERVOUS, ANXIOUS, OR ON EDGE: NOT AT ALL

## 2025-04-28 ASSESSMENT — ACTIVITIES OF DAILY LIVING (ADL)
GROCERY_SHOPPING: INDEPENDENT
DRESSING: INDEPENDENT
TAKING_MEDICATION: INDEPENDENT
DOING_HOUSEWORK: INDEPENDENT
BATHING: INDEPENDENT
MANAGING_FINANCES: INDEPENDENT

## 2025-04-28 NOTE — ASSESSMENT & PLAN NOTE
Stable and controlled  No medications.   Orders:    CBC and Auto Differential; Future    Comprehensive Metabolic Panel; Future    TSH with reflex to Free T4 if abnormal; Future

## 2025-04-28 NOTE — PROGRESS NOTES
Subjective   Reason for Visit: Yoanna Baker is an 69 y.o. female here for a Medicare Wellness visit.     Past Medical, Surgical, and Family History reviewed and updated in chart.    Reviewed all medications by prescribing practitioner or clinical pharmacist (such as prescriptions, OTCs, herbal therapies and supplements) and documented in the medical record.    HPI  Just switched to Mounjaro, at endo, roby appetite was coming back on Ozempic. Lat A1C was up a bit. Skin tags under left axilla. Cough for four to six months, dry cough, phlegm, Started with Upper GI six months ago, throat was on fire, voice changed. UGI showed acid reflux, PPI caused her to have bone loss. Weight up five pounds.   Patient Care Team:  Leeanne Gutierrez MD as PCP - General (Family Medicine)  Leeanne Gutierrez MD as PCP - [employer] PCP  Dino Fitzgerald as Consulting Physician (Endocrinology)     Review of Systems   HENT:  Positive for postnasal drip and voice change.    Respiratory:  Positive for cough.    Musculoskeletal:  Positive for back pain.   Hematological:  Bruises/bleeds easily.   All other systems reviewed and are negative.      Objective   Vitals:  /78   Pulse 84   Resp 16   Ht 1.524 m (5')   Wt 73.5 kg (162 lb)   LMP  (LMP Unknown)   SpO2 95%   BMI 31.64 kg/m²       Physical Exam  Vitals reviewed.   Constitutional:       Appearance: Normal appearance.   HENT:      Head: Normocephalic and atraumatic.      Right Ear: Tympanic membrane normal.      Left Ear: Tympanic membrane normal.      Nose: Nose normal.      Mouth/Throat:      Mouth: Mucous membranes are moist.      Pharynx: Oropharynx is clear.   Eyes:      Extraocular Movements: Extraocular movements intact.      Conjunctiva/sclera: Conjunctivae normal.      Pupils: Pupils are equal, round, and reactive to light.   Cardiovascular:      Rate and Rhythm: Normal rate and regular rhythm.      Pulses: Normal pulses.      Heart sounds:  Normal heart sounds.   Pulmonary:      Effort: Pulmonary effort is normal.      Breath sounds: Normal breath sounds.   Abdominal:      General: Abdomen is flat. Bowel sounds are normal.      Palpations: Abdomen is soft.   Musculoskeletal:         General: Normal range of motion.      Cervical back: Normal range of motion and neck supple.   Skin:     General: Skin is warm and dry.      Capillary Refill: Capillary refill takes less than 2 seconds.   Neurological:      General: No focal deficit present.      Mental Status: She is alert and oriented to person, place, and time.   Psychiatric:         Mood and Affect: Mood normal.         Behavior: Behavior normal.       Assessment & Plan  Type 2 diabetes mellitus without complication, without long-term current use of insulin  Stable and controlled, sees endocrinology, A1c under excellent control, recent change from Ozempic to Mounjaro  Orders:    POCT glycosylated hemoglobin (Hb A1C) manually resulted    Albumin-Creatinine Ratio, Urine Random; Future    Hemoglobin A1C; Future    Follow Up In [facility] - PCP - Established; Future    Visit for screening mammogram    Orders:    BI mammo bilateral screening tomosynthesis; Future    Routine general medical examination at health care facility    Orders:    1 Year Follow Up In [facility] - PCP - Wellness Exam; Future    Class 1 obesity due to excess calories with serious comorbidity and body mass index (BMI) of 31.0 to 31.9 in adult  Stable and controlled         Gastroesophageal reflux disease without esophagitis  Currently on no medications, take pepto bismol as needed  Orders:    TSH with reflex to Free T4 if abnormal; Future    Benign essential hypertension  Stable and controlled  No medications.   Orders:    CBC and Auto Differential; Future    Comprehensive Metabolic Panel; Future    TSH with reflex to Free T4 if abnormal; Future    Mixed hyperlipidemia  Stable and controlled  on Repatha  Orders:     Lipid Panel; Future    Hoarseness of voice  Plan to refer to ENT to get a better look at vocal cords.        Skin tags, multiple acquired  Dr. Interiano  Orders:    Referral to Dermatology    Vocal cords swelling    Orders:    Referral to ENT; Future    Hoarseness or changing voice    Orders:    Referral to ENT; Future    Multiple thyroid nodules    Orders:    TSH with reflex to Free T4 if abnormal; Future

## 2025-04-28 NOTE — PATIENT INSTRUCTIONS
Schedule mammogram at your convenience. Recheck six months with labwork before.   ENT referral for cough and hoarseness  Dermatology referral for skin tags.     Tavon me if you need anything.

## 2025-04-28 NOTE — ASSESSMENT & PLAN NOTE
Stable and controlled, sees endocrinology, A1c under excellent control, recent change from Ozempic to Mounjaro  Orders:    POCT glycosylated hemoglobin (Hb A1C) manually resulted    Albumin-Creatinine Ratio, Urine Random; Future    Hemoglobin A1C; Future    Follow Up In [facility] - PCP - Established; Future

## 2025-04-28 NOTE — ASSESSMENT & PLAN NOTE
Currently on no medications, take pepto bismol as needed  Orders:    TSH with reflex to Free T4 if abnormal; Future

## 2025-04-30 ENCOUNTER — APPOINTMENT (OUTPATIENT)
Dept: PRIMARY CARE | Facility: CLINIC | Age: 70
End: 2025-04-30
Payer: MEDICARE

## 2025-06-04 ENCOUNTER — HOSPITAL ENCOUNTER (OUTPATIENT)
Dept: RADIOLOGY | Facility: HOSPITAL | Age: 70
Discharge: HOME | End: 2025-06-04
Payer: MEDICARE

## 2025-06-04 DIAGNOSIS — R05.3 CHRONIC COUGH: ICD-10-CM

## 2025-06-04 PROCEDURE — 71046 X-RAY EXAM CHEST 2 VIEWS: CPT

## 2025-06-04 PROCEDURE — 71046 X-RAY EXAM CHEST 2 VIEWS: CPT | Performed by: RADIOLOGY

## 2025-06-16 ENCOUNTER — HOSPITAL ENCOUNTER (OUTPATIENT)
Dept: RADIOLOGY | Facility: HOSPITAL | Age: 70
Discharge: HOME | End: 2025-06-16
Payer: MEDICARE

## 2025-06-16 VITALS — HEIGHT: 60 IN | BODY MASS INDEX: 31.8 KG/M2 | WEIGHT: 162 LBS

## 2025-06-16 DIAGNOSIS — Z12.31 VISIT FOR SCREENING MAMMOGRAM: ICD-10-CM

## 2025-06-16 PROCEDURE — 77063 BREAST TOMOSYNTHESIS BI: CPT

## 2025-06-16 PROCEDURE — 77067 SCR MAMMO BI INCL CAD: CPT

## 2025-06-20 ENCOUNTER — TELEPHONE (OUTPATIENT)
Facility: CLINIC | Age: 70
End: 2025-06-20
Payer: MEDICARE

## 2025-06-20 NOTE — TELEPHONE ENCOUNTER
Previous colonoscopy in 2022 showed three hyperplastic polyps. Repeat colonoscopy recommended in 5 years (due in 2027).

## 2025-06-20 NOTE — TELEPHONE ENCOUNTER
----- Message from Jeannie PAYAN sent at 6/20/2025 11:20 AM EDT -----  Regarding: COLON RECALL  Per chart recall, pt due for repeat colonoscopy  - can you place the order for the screening colonoscopy and send to [facility] .  Thank you.    #Chart reviewed for age, BMI and anticoagulants.      COLON 2022 - repeat 3 years    On mounjaro

## 2025-06-27 ENCOUNTER — TELEPHONE (OUTPATIENT)
Dept: PRIMARY CARE | Facility: CLINIC | Age: 70
End: 2025-06-27
Payer: MEDICARE

## 2025-06-27 DIAGNOSIS — M81.6 LOCALIZED OSTEOPOROSIS WITHOUT CURRENT PATHOLOGICAL FRACTURE: ICD-10-CM

## 2025-06-27 RX ORDER — ALENDRONATE SODIUM 70 MG/1
70 TABLET ORAL
Qty: 4 TABLET | Refills: 11 | OUTPATIENT
Start: 2025-06-27 | End: 2026-06-27

## 2025-06-27 NOTE — TELEPHONE ENCOUNTER
alendronate (Fosamax) 70 mg tablet [[id number]]    Order Details  Dose: 70 mg Route: oral Frequency: Every 7 days   Dispense Quantity: 4 tablet (28 day supply) Refills: 11    Duration: 365 days Dispense As Written: No          Sig: Take 1 tablet (70 mg) by mouth every 7 days. Take in the morning with a full glass of water, on an empty stomach, and do not take anything else by mouth or lie down for the next 30 min.         Start Date: 07/19/24 End Date: 07/19/25 after 53 doses   Written Date: 07/19/24 Rx Expiration Date: 07/19/25        Associated Diagnoses: Localized osteoporosis without current pathological fracture [M81.6]     Alejandrina  Last office visit: 4/28/25  Next office visit: 10/28/25

## 2025-07-08 DIAGNOSIS — Z78.9 STATIN INTOLERANCE: ICD-10-CM

## 2025-07-08 DIAGNOSIS — E78.5 HYPERLIPIDEMIA: ICD-10-CM

## 2025-07-08 DIAGNOSIS — I25.10 CAD (CORONARY ARTERY DISEASE): ICD-10-CM

## 2025-07-08 RX ORDER — EVOLOCUMAB 140 MG/ML
140 INJECTION, SOLUTION SUBCUTANEOUS
Qty: 6 ML | Refills: 3 | Status: SHIPPED | OUTPATIENT
Start: 2025-07-08 | End: 2026-07-08

## 2025-07-09 ENCOUNTER — HOSPITAL ENCOUNTER (OUTPATIENT)
Dept: RADIOLOGY | Facility: HOSPITAL | Age: 70
Discharge: HOME | End: 2025-07-09
Payer: MEDICARE

## 2025-07-09 DIAGNOSIS — E04.9 NONTOXIC GOITER, UNSPECIFIED: ICD-10-CM

## 2025-07-09 PROCEDURE — 76536 US EXAM OF HEAD AND NECK: CPT | Performed by: STUDENT IN AN ORGANIZED HEALTH CARE EDUCATION/TRAINING PROGRAM

## 2025-07-09 PROCEDURE — 76536 US EXAM OF HEAD AND NECK: CPT

## 2025-08-04 ENCOUNTER — TELEPHONE (OUTPATIENT)
Dept: PRIMARY CARE | Facility: CLINIC | Age: 70
End: 2025-08-04
Payer: MEDICARE

## 2025-08-04 DIAGNOSIS — M81.6 LOCALIZED OSTEOPOROSIS WITHOUT CURRENT PATHOLOGICAL FRACTURE: ICD-10-CM

## 2025-08-04 RX ORDER — ALENDRONATE SODIUM 70 MG/1
70 TABLET ORAL
Qty: 4 TABLET | Refills: 11 | Status: SHIPPED | OUTPATIENT
Start: 2025-08-04 | End: 2025-09-01

## 2025-08-04 NOTE — TELEPHONE ENCOUNTER
Requested Prescriptions     Pending Prescriptions Disp Refills    alendronate (Fosamax) 70 mg tablet 4 tablet 0     Sig: Take 1 tablet (70 mg) by mouth every 7 days for 28 days. Take in the morning with a full glass of water, on an empty stomach, and do not take anything else by mouth or lie down for the next 30 min.     Lov 4/28/25      Nov 10/28/25

## 2025-08-18 DIAGNOSIS — I25.10 CAD (CORONARY ARTERY DISEASE): ICD-10-CM

## 2025-08-18 DIAGNOSIS — Z78.9 STATIN INTOLERANCE: ICD-10-CM

## 2025-08-18 DIAGNOSIS — E78.5 HYPERLIPIDEMIA: ICD-10-CM

## 2025-08-18 RX ORDER — EVOLOCUMAB 140 MG/ML
140 INJECTION, SOLUTION SUBCUTANEOUS
Qty: 6 ML | Refills: 3 | Status: SHIPPED | OUTPATIENT
Start: 2025-08-18 | End: 2026-08-18

## 2025-10-28 ENCOUNTER — APPOINTMENT (OUTPATIENT)
Dept: PRIMARY CARE | Facility: CLINIC | Age: 70
End: 2025-10-28
Payer: MEDICARE

## 2026-04-28 ENCOUNTER — APPOINTMENT (OUTPATIENT)
Dept: PRIMARY CARE | Facility: CLINIC | Age: 71
End: 2026-04-28
Payer: MEDICARE

## (undated) DEVICE — TR BAND, RADIAL COMPRESSION, STANDARD, 24CM

## (undated) DEVICE — GUIDEWIRE, INQUIRE, J TIP, .035 X 210CM, FIXED CORE, DIAGNOSTIC

## (undated) DEVICE — CATHETER, OPTITORQUE, 5FR, JACKY, 3.5/ 2H/110CM, CURVED

## (undated) DEVICE — SHEATH, GLIDESHEATH, SLENDER, 5FR 10CM